# Patient Record
Sex: FEMALE | Race: WHITE
[De-identification: names, ages, dates, MRNs, and addresses within clinical notes are randomized per-mention and may not be internally consistent; named-entity substitution may affect disease eponyms.]

---

## 2018-12-08 ENCOUNTER — HOSPITAL ENCOUNTER (EMERGENCY)
Dept: HOSPITAL 47 - EC | Age: 63
Discharge: OTHER INSTITUTION | End: 2018-12-08
Payer: MEDICARE

## 2018-12-08 VITALS — TEMPERATURE: 98 F

## 2018-12-08 VITALS — DIASTOLIC BLOOD PRESSURE: 58 MMHG | RESPIRATION RATE: 16 BRPM | HEART RATE: 60 BPM | SYSTOLIC BLOOD PRESSURE: 142 MMHG

## 2018-12-08 DIAGNOSIS — E11.40: ICD-10-CM

## 2018-12-08 DIAGNOSIS — I10: ICD-10-CM

## 2018-12-08 DIAGNOSIS — A52.16: ICD-10-CM

## 2018-12-08 DIAGNOSIS — Z79.4: ICD-10-CM

## 2018-12-08 DIAGNOSIS — Z86.14: ICD-10-CM
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
## 2018-12-08 DIAGNOSIS — Z88.2: ICD-10-CM
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
## 2018-12-08 DIAGNOSIS — E66.01: ICD-10-CM

## 2018-12-08 DIAGNOSIS — S82.841A: Primary | ICD-10-CM

## 2018-12-08 DIAGNOSIS — Z79.899: ICD-10-CM

## 2018-12-08 LAB — GLUCOSE BLD-MCNC: 118 MG/DL (ref 75–99)

## 2018-12-08 PROCEDURE — 99284 EMERGENCY DEPT VISIT MOD MDM: CPT

## 2018-12-08 PROCEDURE — 36415 COLL VENOUS BLD VENIPUNCTURE: CPT

## 2018-12-08 PROCEDURE — 96376 TX/PRO/DX INJ SAME DRUG ADON: CPT

## 2018-12-08 PROCEDURE — 96375 TX/PRO/DX INJ NEW DRUG ADDON: CPT

## 2018-12-08 PROCEDURE — 96374 THER/PROPH/DIAG INJ IV PUSH: CPT

## 2018-12-08 NOTE — ED
General Adult HPI





- General


Source: patient, RN notes reviewed, old records reviewed


Mode of arrival: ambulatory


Limitations: no limitations





<Primitivo Caicedo - Last Filed: 12/08/18 19:59>





<Rommel Chowdhury - Last Filed: 12/08/18 20:14>





- General


Chief complaint: Fall


Stated complaint: Fall/ankle injury


Time Seen by Provider: 12/08/18 17:50





- History of Present Illness


Initial comments: 





Patient's a 63-year-old female presenting to the emergency room today by 

transfer from [facility] for a fall that occurred at 10 AM this morning.

  She states that she was walking out of her house down a ramp and she had some 

bags her hands she slipped with her right leg falling underneath her left as 

she went down.  She states that she's had pain to the right ankle was taken to 

the hospital.  Patient was transferred here for orthopedic consult.  Patient 

doesn't pain locally.  Patient denies any other complaints or symptoms at this 

time. Patient denies any recent fever, chills, shortness of breath, chest pain, 

back pain, abdominal pain, nausea or vomiting, headaches or visual changes, or 

any other complaints. (Primitivo Caicedo)





- Related Data


 Home Medications











 Medication  Instructions  Recorded  Confirmed


 


Folic Acid 1 tab PO Q48H 05/01/14 12/08/18


 


Multivitamin [Multivitamins] 1 tab PO DAILY 05/01/14 12/08/18


 


metFORMIN HCL [Glucophage] 500 mg PO TID 05/01/14 12/08/18


 


Ascorbic Acid [Vitamin C] 500 mg PO BID 05/08/14 12/08/18


 


Insulin Degludec [Tresiba 70 unit SQ HS 12/08/18 12/08/18





Flextouch U-100]   


 


Liraglutide [Victoza 2-Fredrick] 1.8 mg SQ HS 12/08/18 12/08/18


 


Lisinopril [Zestril] 2.5 mg PO DAILY 12/08/18 12/08/18


 


Vit C/E/Zn/Coppr/Lutein/Zeaxan 1 cap PO DAILY 12/08/18 12/08/18





[Preservision Areds 2 Softgel]   











 Allergies











Allergy/AdvReac Type Severity Reaction Status Date / Time


 


sulfamethoxazole Allergy  Unknown Verified 12/08/18 17:54





[From Bactrim]     


 


trimethoprim [From Bactrim] Allergy  Unknown Verified 12/08/18 17:54














Review of Systems


ROS Other: All systems not noted in ROS Statement are negative.





<Primitivo Caicedo - Last Filed: 12/08/18 19:59>


ROS Other: All systems not noted in ROS Statement are negative.





<Rommel Chowdhury - Last Filed: 12/08/18 20:14>


ROS Statement: 


Those systems with pertinent positive or pertinent negative responses have been 

documented in the HPI.








Past Medical History


Past Medical History: Diabetes Mellitus, Hypertension


Additional Past Medical History / Comment(s): neuropathy; wound L Leg


History of Any Multi-Drug Resistant Organisms: MRSA


Date of last positivie culture/infection: 2014


MDRO Source:: left foot


Past Surgical History: Back Surgery, Cholecystectomy, Hysterectomy, Orthopedic 

Surgery


Past Anesthesia/Blood Transfusion Reactions: No Reported Reaction


Past Psychological History: No Psychological Hx Reported


Smoking Status: Never smoker


Past Alcohol Use History: Occasional


Past Drug Use History: None Reported





- Past Family History


  ** Mother


Family Medical History: No Reported History





<Primitivo Caicedo - Last Filed: 12/08/18 19:59>





General Exam


Limitations: no limitations





<Primitivo Caicedo - Last Filed: 12/08/18 19:59>





<Rommel Chowdhury - Last Filed: 12/08/18 20:14>





- General Exam Comments


Initial Comments: 





General:  The patient is awake and alert, in no distress, and does not appear 

acutely ill. 


Cardiovascular:  There is a regular rate and rhythm. No murmur, rub or gallop 

is appreciated.


Respiratory:  Lungs are clear to auscultation, respirations are non-labored, 

breath sounds are equal.  No wheezes, stridor, rales, or rhonchi.


Musculoskeletal: Patient's right lower extremities is placed in a posterior OCL 

splint..  Pulses checked and 2+.  Sensations intact.  Cap refill less than 2 

seconds.  


Neurological:  A&O x 3. CN II-XII intact, There are no obvious motor or sensory 

deficits. Coordination appears grossly intact. Speech is normal.


Skin:  Skin is warm and dry and no rashes or lesions are noted. 


Psychiatric:  Cooperative, appropriate mood & affect, normal judgment.   (Primitivo Caicedo)





Course





<Primitivo Caicedo - Last Filed: 12/08/18 19:59>





<Rommel Chowdhury - Last Filed: 12/08/18 20:14>





 Vital Signs











  12/08/18 12/08/18





  17:43 20:04


 


Temperature 98 F 


 


Pulse Rate 75 79


 


Respiratory 16 15





Rate  


 


Blood Pressure 133/58 105/50


 


O2 Sat by Pulse 98 95





Oximetry  














- Reevaluation(s)


Reevaluation #1: 





12/08/18 20:14


medical transfer paperwork and record reviewed (Rommel Chowdhury)





Medical Decision Making





<Primitivo Caicedo - Last Filed: 12/08/18 19:59>





<Rommel Chowdhury - Last Filed: 12/08/18 20:14>





- Medical Decision Making





1843: Case discussed with nurse practitioner Edna on-call for orthopedics.  

She states she will discuss case with Dr. Matthews.





1915: Nurse Practitioner Edna has called back and states that she did speak 

with Dr. Matthews about the fracture.  States that it is a complication case 

and should be transferred. 





 Discussed with attending Dr Chowdhury who has spoken with [facility]ix[facility] Jay and 

awaiting to hear back from them.  (Primitivo Caicedo)





63 female the ER for evaluation will transfer Shayne Mason for orthopedic 

traumatic treatment and evaluation (Rommel Chowdhury)





- Lab Data





 Lab Results











  12/08/18 Range/Units





  20:03 


 


POC Glucose (mg/dL)  118 H  (75-99)  mg/dL


 


POC Glu Operater ID  Edison Saul  














Disposition





<Primitivo Caicedo - Last Filed: 12/08/18 19:59>


Is patient prescribed a controlled substance at d/c from ED?: No





- Out of Hospital Transfer - Req. Specs


Out of Hospital Transfer - Requested Specifics: Other Emergency Center ([facility])





<Rommel Chowdhury - Last Filed: 12/08/18 20:14>


Clinical Impression: 


 Morbid obesity with BMI of 45.0-49.9, adult, Morbid obesity due to excess 

calories, Charcot's joint of foot, Bimalleolar fracture of right ankle





Narrative: 





Intraarticular Comminuted Fracture of Distal Tibia and Fibula (Rommel Chowdhury)


Disposition: OTHER INSTITUTION NOT DEFINED


Condition: Fair


Referrals: 


Trevor Bear, DO [Primary Care Provider] - 1-2 days

## 2020-05-26 ENCOUNTER — HOSPITAL ENCOUNTER (INPATIENT)
Dept: HOSPITAL 47 - EC | Age: 65
LOS: 5 days | Discharge: HOME | DRG: 246 | End: 2020-05-31
Attending: HOSPITALIST | Admitting: HOSPITALIST
Payer: MEDICARE

## 2020-05-26 VITALS — BODY MASS INDEX: 53.8 KG/M2

## 2020-05-26 DIAGNOSIS — I08.1: ICD-10-CM

## 2020-05-26 DIAGNOSIS — J98.11: ICD-10-CM

## 2020-05-26 DIAGNOSIS — Z71.3: ICD-10-CM

## 2020-05-26 DIAGNOSIS — I21.4: Primary | ICD-10-CM

## 2020-05-26 DIAGNOSIS — J44.1: ICD-10-CM

## 2020-05-26 DIAGNOSIS — Z79.899: ICD-10-CM

## 2020-05-26 DIAGNOSIS — E11.40: ICD-10-CM

## 2020-05-26 DIAGNOSIS — Z90.710: ICD-10-CM

## 2020-05-26 DIAGNOSIS — Z90.49: ICD-10-CM

## 2020-05-26 DIAGNOSIS — E87.1: ICD-10-CM

## 2020-05-26 DIAGNOSIS — Z79.4: ICD-10-CM

## 2020-05-26 DIAGNOSIS — I27.21: ICD-10-CM

## 2020-05-26 DIAGNOSIS — N18.3: ICD-10-CM

## 2020-05-26 DIAGNOSIS — E78.5: ICD-10-CM

## 2020-05-26 DIAGNOSIS — Z11.59: ICD-10-CM

## 2020-05-26 DIAGNOSIS — Z86.14: ICD-10-CM

## 2020-05-26 DIAGNOSIS — I13.0: ICD-10-CM

## 2020-05-26 DIAGNOSIS — I25.10: ICD-10-CM

## 2020-05-26 DIAGNOSIS — K76.1: ICD-10-CM

## 2020-05-26 DIAGNOSIS — J96.01: ICD-10-CM

## 2020-05-26 DIAGNOSIS — E11.22: ICD-10-CM

## 2020-05-26 DIAGNOSIS — I50.23: ICD-10-CM

## 2020-05-26 DIAGNOSIS — I25.5: ICD-10-CM

## 2020-05-26 DIAGNOSIS — E66.2: ICD-10-CM

## 2020-05-26 DIAGNOSIS — Z98.890: ICD-10-CM

## 2020-05-26 DIAGNOSIS — Z88.2: ICD-10-CM

## 2020-05-26 DIAGNOSIS — I87.8: ICD-10-CM

## 2020-05-26 LAB
ALBUMIN SERPL-MCNC: 3.4 G/DL (ref 3.5–5)
ALP SERPL-CCNC: 146 U/L (ref 38–126)
ALT SERPL-CCNC: 45 U/L (ref 4–34)
ANION GAP SERPL CALC-SCNC: 9 MMOL/L
AST SERPL-CCNC: 39 U/L (ref 14–36)
BASOPHILS # BLD AUTO: 0 K/UL (ref 0–0.2)
BASOPHILS NFR BLD AUTO: 0 %
BUN SERPL-SCNC: 30 MG/DL (ref 7–17)
CALCIUM SPEC-MCNC: 8.4 MG/DL (ref 8.4–10.2)
CHLORIDE SERPL-SCNC: 98 MMOL/L (ref 98–107)
CO2 SERPL-SCNC: 26 MMOL/L (ref 22–30)
EOSINOPHIL # BLD AUTO: 0 K/UL (ref 0–0.7)
EOSINOPHIL NFR BLD AUTO: 0 %
ERYTHROCYTE [DISTWIDTH] IN BLOOD BY AUTOMATED COUNT: 4.33 M/UL (ref 3.8–5.4)
ERYTHROCYTE [DISTWIDTH] IN BLOOD: 14.7 % (ref 11.5–15.5)
GLUCOSE BLD-MCNC: 179 MG/DL (ref 75–99)
GLUCOSE BLD-MCNC: 242 MG/DL (ref 75–99)
GLUCOSE SERPL-MCNC: 167 MG/DL (ref 74–99)
HCT VFR BLD AUTO: 37.5 % (ref 34–46)
HGB BLD-MCNC: 11.5 GM/DL (ref 11.4–16)
LYMPHOCYTES # SPEC AUTO: 0.5 K/UL (ref 1–4.8)
LYMPHOCYTES NFR SPEC AUTO: 7 %
MCH RBC QN AUTO: 26.7 PG (ref 25–35)
MCHC RBC AUTO-ENTMCNC: 30.8 G/DL (ref 31–37)
MCV RBC AUTO: 86.6 FL (ref 80–100)
MONOCYTES # BLD AUTO: 0.1 K/UL (ref 0–1)
MONOCYTES NFR BLD AUTO: 2 %
NEUTROPHILS # BLD AUTO: 6.5 K/UL (ref 1.3–7.7)
NEUTROPHILS NFR BLD AUTO: 90 %
PLATELET # BLD AUTO: 280 K/UL (ref 150–450)
POTASSIUM SERPL-SCNC: 4.8 MMOL/L (ref 3.5–5.1)
PROT SERPL-MCNC: 6.2 G/DL (ref 6.3–8.2)
SODIUM SERPL-SCNC: 133 MMOL/L (ref 137–145)
WBC # BLD AUTO: 7.3 K/UL (ref 3.8–10.6)

## 2020-05-26 PROCEDURE — 84484 ASSAY OF TROPONIN QUANT: CPT

## 2020-05-26 PROCEDURE — 94640 AIRWAY INHALATION TREATMENT: CPT

## 2020-05-26 PROCEDURE — 87635 SARS-COV-2 COVID-19 AMP PRB: CPT

## 2020-05-26 PROCEDURE — 96366 THER/PROPH/DIAG IV INF ADDON: CPT

## 2020-05-26 PROCEDURE — 94760 N-INVAS EAR/PLS OXIMETRY 1: CPT

## 2020-05-26 PROCEDURE — 96375 TX/PRO/DX INJ NEW DRUG ADDON: CPT

## 2020-05-26 PROCEDURE — 85730 THROMBOPLASTIN TIME PARTIAL: CPT

## 2020-05-26 PROCEDURE — 96365 THER/PROPH/DIAG IV INF INIT: CPT

## 2020-05-26 PROCEDURE — 99285 EMERGENCY DEPT VISIT HI MDM: CPT

## 2020-05-26 PROCEDURE — 71045 X-RAY EXAM CHEST 1 VIEW: CPT

## 2020-05-26 PROCEDURE — 83880 ASSAY OF NATRIURETIC PEPTIDE: CPT

## 2020-05-26 PROCEDURE — 80053 COMPREHEN METABOLIC PANEL: CPT

## 2020-05-26 PROCEDURE — 96376 TX/PRO/DX INJ SAME DRUG ADON: CPT

## 2020-05-26 PROCEDURE — 85025 COMPLETE CBC W/AUTO DIFF WBC: CPT

## 2020-05-26 PROCEDURE — 93306 TTE W/DOPPLER COMPLETE: CPT

## 2020-05-26 PROCEDURE — 93458 L HRT ARTERY/VENTRICLE ANGIO: CPT

## 2020-05-26 PROCEDURE — 80048 BASIC METABOLIC PNL TOTAL CA: CPT

## 2020-05-26 PROCEDURE — 96372 THER/PROPH/DIAG INJ SC/IM: CPT

## 2020-05-26 PROCEDURE — 85027 COMPLETE CBC AUTOMATED: CPT

## 2020-05-26 RX ADMIN — IPRATROPIUM BROMIDE AND ALBUTEROL SULFATE SCH ML: .5; 3 SOLUTION RESPIRATORY (INHALATION) at 10:56

## 2020-05-26 RX ADMIN — INSULIN ASPART SCH UNIT: 100 INJECTION, SOLUTION INTRAVENOUS; SUBCUTANEOUS at 21:38

## 2020-05-26 RX ADMIN — IPRATROPIUM BROMIDE AND ALBUTEROL SULFATE SCH ML: .5; 3 SOLUTION RESPIRATORY (INHALATION) at 15:44

## 2020-05-26 RX ADMIN — HEPARIN SODIUM SCH MLS/HR: 10000 INJECTION, SOLUTION INTRAVENOUS at 21:41

## 2020-05-26 RX ADMIN — FUROSEMIDE SCH MG: 10 INJECTION, SOLUTION INTRAMUSCULAR; INTRAVENOUS at 21:38

## 2020-05-26 RX ADMIN — IPRATROPIUM BROMIDE AND ALBUTEROL SULFATE SCH ML: .5; 3 SOLUTION RESPIRATORY (INHALATION) at 19:47

## 2020-05-26 RX ADMIN — INSULIN ASPART SCH UNIT: 100 INJECTION, SOLUTION INTRAVENOUS; SUBCUTANEOUS at 17:09

## 2020-05-26 RX ADMIN — FUROSEMIDE SCH: 10 INJECTION, SOLUTION INTRAMUSCULAR; INTRAVENOUS at 09:46

## 2020-05-26 NOTE — ED
Recheck HPI





- General


Chief Complaint: Shortness of Breath


Stated Complaint: Shortness of Breath


Time Seen by Provider: 05/26/20 04:40


Source: patient, EMS, RN notes reviewed, old records reviewed


Mode of arrival: EMS


Limitations: no limitations





- History of Present Illness


Initial Comments: 





This is a 65-year-old female she is presented today is accepted in transfer for 

evaluation regarding CHF COPD hypoxia respiratory disease as well as elevated 

troponin elevated be Troponin mildly elevated 0.03 be 1 2000.  Patient feeling 

better on arrival here in the emergency department, no current chest pain or 

shortness of breath states his symptoms have much improved since arrival at the 

emergency department


MD Complaint: abnormal lab (Patient abnormal labs and abnormal physical exam)


-: days(s)


Returns Today for: Called Because of Abnormal Lab/Test


Symptoms Since Prior Visit: no new symptoms


Context: planned re-check


Associated Symptoms: none





- Related Data


                                Home Medications











 Medication  Instructions  Recorded  Confirmed


 


Folic Acid 1 tab PO Q48H 05/01/14 12/08/18


 


Multivitamin [Multivitamins] 1 tab PO DAILY 05/01/14 12/08/18


 


metFORMIN HCL [Glucophage] 500 mg PO TID 05/01/14 12/08/18


 


Ascorbic Acid [Vitamin C] 500 mg PO BID 05/08/14 12/08/18


 


Insulin Degludec [Tresiba 70 unit SQ HS 12/08/18 12/08/18





Flextouch U-100]   


 


Liraglutide [Victoza 2-Fredrick] 1.8 mg SQ HS 12/08/18 12/08/18


 


Lisinopril [Zestril] 2.5 mg PO DAILY 12/08/18 12/08/18


 


Vit C/E/Zn/Coppr/Lutein/Zeaxan 1 cap PO DAILY 12/08/18 12/08/18





[Preservision Areds 2 Softgel]   











                                    Allergies











Allergy/AdvReac Type Severity Reaction Status Date / Time


 


sulfamethoxazole Allergy  Unknown Verified 12/08/18 17:54





[From Bactrim]     


 


trimethoprim [From Bactrim] Allergy  Unknown Verified 12/08/18 17:54














Review of Systems


ROS Statement: 


Those systems with pertinent positive or pertinent negative responses have been 

documented in the HPI.





ROS Other: All systems not noted in ROS Statement are negative.





Past Medical History


Past Medical History: Diabetes Mellitus, Hypertension


Additional Past Medical History / Comment(s): neuropathy; wound L Leg


History of Any Multi-Drug Resistant Organisms: MRSA


Date of last positivie culture/infection: 2014


MDRO Source:: left foot


Past Surgical History: Back Surgery, Cholecystectomy, Hysterectomy, Orthopedic 

Surgery


Past Anesthesia/Blood Transfusion Reactions: No Reported Reaction


Past Psychological History: No Psychological Hx Reported


Smoking Status: Never smoker


Past Alcohol Use History: Occasional


Past Drug Use History: None Reported





- Past Family History


  ** Mother


Family Medical History: No Reported History





General Exam


Limitations: no limitations


General appearance: alert, in no apparent distress


Head exam: Present: atraumatic, normocephalic, normal inspection


Eye exam: Present: normal appearance, PERRL, EOMI.  Absent: scleral icterus, 

conjunctival injection, periorbital swelling


ENT exam: Present: normal exam, mucous membranes moist


Neck exam: Present: normal inspection.  Absent: tenderness, meningismus, 

lymphadenopathy


Respiratory exam: Present: respiratory distress, wheezes, rales, accessory 

muscle use, decreased breath sounds, prolonged expiratory.  Absent: rhonchi, 

stridor


Cardiovascular Exam: Present: regular rate, normal rhythm, normal heart sounds. 

Absent: systolic murmur, diastolic murmur, rubs, gallop, clicks


GI/Abdominal exam: Present: soft, normal bowel sounds.  Absent: distended, 

tenderness, guarding, rebound, rigid


Extremities exam: Present: normal inspection, full ROM, normal capillary refill.

 Absent: tenderness, pedal edema, joint swelling, calf tenderness


Back exam: Present: normal inspection


Neurological exam: Present: alert, oriented X3, CN II-XII intact


Psychiatric exam: Present: normal affect, normal mood


Skin exam: Present: warm, dry, intact, normal color.  Absent: rash





Course


                                   Vital Signs











  05/26/20 05/26/20





  04:16 04:22


 


Temperature 98.9 F 


 


Pulse Rate 76 


 


Respiratory 18 18





Rate  


 


Blood Pressure 102/65 


 


O2 Sat by Pulse 100 





Oximetry  














- Reevaluation(s)


Reevaluation #1: 





05/26/20 05:04


Medical record transferring paperwork is reviewed





Spoke with transferring physician agreeable transfer for


Reevaluation #2: 





05/26/20 05:05


Patient has no complaints of chest pain or shortness of breath





- Consultations


Consultation #1: 





Spoke with Dr. Mead agreeable for admission





Medical Decision Making





- EKG Data


-: EKG Interpreted by Me (EKG shows sinus rhythm of 77, , QRS 96, QTc 468)





Disposition


Clinical Impression: 


 Acute pulmonary edema, Acute exacerbation of chronic obstructive pulmonary 

disease, Congestive heart failure, Hypoxia





Disposition: ADMITTED AS IP TO THIS HOSP


Condition: Fair


Is patient prescribed a controlled substance at d/c from ED?: No


Referrals: 


Trevor Bear DO [Primary Care Provider] - 1-2 days

## 2020-05-26 NOTE — P.CRDCN
History of Present Illness


Consult date: 05/26/20


Chief complaint: Shortness of breath/lower extremities edema


History of present illness: 





This is a very pleasant 65-year-old female patient with a past medical history 

significant for morbid obesity, diabetes, hypertension, and dyslipidemia as well

as tonic lower extremities edema.  The patient was in her usual state of altered

about 3 weeks ago when she started experiencing progressive exertional dyspnea 

associated with progressive lower extremities edema.  She stated that she gained

50 pounds within one month.  No symptoms of chest pain or chest discomfort, 

dizziness, heart racing, or syncope.  The patient ever being told in the past t

hat she does have congestive heart failure.  The patient never seen any 

cardiologist as an outpatient.  No history of coronary artery disease or any 

cardiac arrhythmia.  On physical examination she does have severe bilateral 

lower extremities edema was chronic skin changes.  She does have diminished 

breathing sounds during her physical examination as well.  The EKG showed sinus 

rhythm without any significant ST or T-wave abnormalities but nonspecific 

changes.  No blood work in the computer but I am going to review her blood work 

from the hospital she was transferred from.  Meanwhile we'll repeat her blood 

work including CBC, BMP, as well as cardiac enzymes, as well as BMP.  The 

patient was started on Lasix already.  I am going to obtain an echocardiogram to

evaluate her left ventricular systolic and diastolic function and also for any 

intracardiac valves abnormalities.  We'll continue following up with the 

patient.





Past Medical History


Past Medical History: Diabetes Mellitus, Hypertension


Additional Past Medical History / Comment(s): neuropathy; wound L Leg


History of Any Multi-Drug Resistant Organisms: MRSA


Date of last positivie culture/infection: 2014


MDRO Source:: left foot


Past Surgical History: Back Surgery, Cholecystectomy, Hysterectomy, Orthopedic 

Surgery


Past Anesthesia/Blood Transfusion Reactions: No Reported Reaction


Past Psychological History: No Psychological Hx Reported


Smoking Status: Never smoker


Past Alcohol Use History: Occasional


Past Drug Use History: None Reported





- Past Family History


  ** Mother


Family Medical History: No Reported History





Medications and Allergies


                                Home Medications











 Medication  Instructions  Recorded  Confirmed  Type


 


Multivitamin [Multivitamins] 1 tab PO DAILY 05/01/14 05/26/20 History


 


Ascorbic Acid [Vitamin C] 500 mg PO BID 05/08/14 05/26/20 History


 


Insulin Degludec [Tresiba 70 units SQ HS 05/26/20 05/26/20 History





Flextouch U-200]    


 


Liraglutide [Victoza 3-Fredrick] 1.8 mg SQ DAILY 05/26/20 05/26/20 History


 


Lisinopril [Zestril] 10 mg PO DAILY 05/26/20 05/26/20 History


 


Tolterodine Tartrate [Detrol LA] 4 mg PO DAILY 05/26/20 05/26/20 History


 


metFORMIN HCL [Glucophage] 500 mg PO TID 05/26/20 05/26/20 History








                                    Allergies











Allergy/AdvReac Type Severity Reaction Status Date / Time


 


sulfamethoxazole Allergy  Unknown Verified 05/26/20 08:56





[From Bactrim]     


 


trimethoprim [From Bactrim] Allergy  Unknown Verified 05/26/20 08:56














Physical Exam


Vitals: 


                                   Vital Signs











  Temp Pulse Resp BP Pulse Ox


 


 05/26/20 11:17  98.1 F  80  20  126/74  97


 


 05/26/20 11:06   88   


 


 05/26/20 10:56   88   


 


 05/26/20 10:37   76  20  123/67  97


 


 05/26/20 06:00   75  18  112/61  99


 


 05/26/20 05:29   76   


 


 05/26/20 05:20   74   


 


 05/26/20 04:22    18  


 


 05/26/20 04:16  98.9 F  76  18  102/65  100








                                Intake and Output











 05/25/20 05/26/20 05/26/20





 22:59 06:59 14:59


 


Output Total   700


 


Balance   -700


 


Output:   


 


  Urine   700


 


Other:   


 


  Voiding Method   Indwelling Catheter


 


  Weight  151.5 kg 














- Constitutional


General appearance: no acute distress





- Respiratory


Respiratory: bilateral: diminished





- Cardiovascular


Rhythm: regular


Heart sounds: normal: S1, S2


Abnormal Heart Sounds: systolic murmur





Results





                                 05/26/20 11:59





                                 05/26/20 11:59


                                 Cardiac Enzymes











  05/26/20 Range/Units





  11:59 


 


AST  39 H  (14-36)  U/L








                                       CBC











  05/26/20 Range/Units





  11:59 


 


WBC  7.3  (3.8-10.6)  k/uL


 


RBC  4.33  (3.80-5.40)  m/uL


 


Hgb  11.5  (11.4-16.0)  gm/dL


 


Hct  37.5  (34.0-46.0)  %


 


Plt Count  280  (150-450)  k/uL








                          Comprehensive Metabolic Panel











  05/26/20 Range/Units





  11:59 


 


Sodium  133 L  (137-145)  mmol/L


 


Potassium  4.8  (3.5-5.1)  mmol/L


 


Chloride  98  ()  mmol/L


 


Carbon Dioxide  26  (22-30)  mmol/L


 


BUN  30 H  (7-17)  mg/dL


 


Creatinine  1.13 H  (0.52-1.04)  mg/dL


 


Glucose  167 H  (74-99)  mg/dL


 


Calcium  8.4  (8.4-10.2)  mg/dL


 


AST  39 H  (14-36)  U/L


 


ALT  45 H  (4-34)  U/L


 


Alkaline Phosphatase  146 H  ()  U/L


 


Total Protein  6.2 L  (6.3-8.2)  g/dL


 


Albumin  3.4 L  (3.5-5.0)  g/dL








                               Current Medications











Generic Name Dose Route Start Last Admin





  Trade Name Freq  PRN Reason Stop Dose Admin


 


Albuterol Sulfate  2.5 mg  05/26/20 04:58 





  Ventolin Nebulized  INHALATION  





  RT-QID PRN  





  Shortness Of Breath  


 


Albuterol/Ipratropium  3 ml  05/26/20 08:00  05/26/20 10:56





  Duoneb 0.5 Mg-3 Mg/3 Ml Soln  INHALATION   3 ml





  RT-QID ANNE   Administration


 


Enoxaparin Sodium  40 mg  05/26/20 09:00  05/26/20 11:16





  Lovenox  SQ   40 mg





  DAILY ANNE   Administration


 


Furosemide  40 mg  05/26/20 09:00  05/26/20 09:46





  Lasix  IV   Not Given





  Q12HR ANNE  


 


Lisinopril  10 mg  05/27/20 09:00 





  Zestril  PO  





  DAILY ANNE  








                                Intake and Output











 05/25/20 05/26/20 05/26/20





 22:59 06:59 14:59


 


Output Total   700


 


Balance   -700


 


Output:   


 


  Urine   700


 


Other:   


 


  Voiding Method   Indwelling Catheter


 


  Weight  151.5 kg 








                                        





                                 05/26/20 11:59 





                                 05/26/20 11:59 











Assessment and Plan


Assessment: 





Assessment


#1 congestive heart failure exacerbation of unknown etiology


#2 morbid obesity


#3 diabetes


#4 multiple comorbid conditions





Plan


#1 rule out acute coronary event.  We'll follow-up on the serial cardiac enzymes


#2 review with a blood work from the hospital she was transferred from


#3 obtain an echocardiogram was Doppler


#4 continue the current dose of Lasix IV


#5 continue monitoring the kidney function and electrolytes


#6 follow-up with the patient





Thank you for allowing us participate in her care

## 2020-05-26 NOTE — P.CNPUL
History of Present Illness


Consult date: 05/26/20


Requesting physician: Rommel Chowdhury


Reason for consult: dyspnea, pleural effusion, abnormal CXR/CT


Chief complaint: shortness of breath


History of present illness: 


 65-year-old white female patient of Dr. Bella Thorpe in [place], with history

of hypertension, and history of nonhealing left leg wound with MRSA infection, 

morbid obesity, nonsmoker, the patient denies any history of chronic lung 

disease. patient presented to the emergency department on 05/26/2020 at 4:15 in 

the morning per EMS from [facility] for evaluation of worsening shortness

of breath, patient felt like she could not get a breath in, but denied any chest

pain, no fever or chills.  She states she had significant weight gain of around 

40 pounds in the last month.  Her lower extremities appear to have changes of 

chronic venous stasis and chronic swelling, however patient states they're 

significantly more swollen than usual. chest x-ray showed mild congestion, 

probable atelectasis at the lung bases, and pleural effusions. EKG showed normal

sinus rhythm with a rate of 77 with low voltage QRS, and evidence of anterior 

infarct of undetermined age. at [facility] patient had abnormal troponins

and possibility of non-ST elevated MI is being considered. She was started on IV

Lasix, IV steroids, and nebulized bronchodilators.  Currently feeling better. 

She is awaiting a bed on selective care unit.








Review of Systems


All systems: negative


Constitutional: Denies chills, Denies fever


Eyes: denies blurred vision, denies pain


Ears, nose, mouth and throat: Denies headache, Denies sore throat


Cardiovascular: Reports decreased exercise tolerance, Reports dyspnea on 

exertion, Reports edema, Reports orthopnea, Denies chest pain, Denies shortness 

of breath


Respiratory: Reports dyspnea, Denies cough


Gastrointestinal: Denies abdominal pain, Denies diarrhea, Denies nausea, Denies 

vomiting


Genitourinary: Denies dysuria, Denies hematuria


Musculoskeletal: Denies myalgias


Integumentary: Reports darkening of skin, Denies pruritus, Denies rash


Neurological: Denies numbness, Denies weakness


Psychiatric: Denies anxiety, Denies depression


Endocrine: Denies fatigue, Denies weight change





Past Medical History


Past Medical History: Diabetes Mellitus, Hypertension


Additional Past Medical History / Comment(s): neuropathy; wound L Leg


History of Any Multi-Drug Resistant Organisms: MRSA


Date of last positivie culture/infection: 2014


MDRO Source:: left foot


Past Surgical History: Back Surgery, Cholecystectomy, Hysterectomy, Orthopedic 

Surgery


Past Anesthesia/Blood Transfusion Reactions: No Reported Reaction


Past Psychological History: No Psychological Hx Reported


Smoking Status: Never smoker


Past Alcohol Use History: Occasional


Past Drug Use History: None Reported





- Past Family History


  ** Mother


Family Medical History: No Reported History





Medications and Allergies


                                Home Medications











 Medication  Instructions  Recorded  Confirmed  Type


 


Multivitamin [Multivitamins] 1 tab PO DAILY 05/01/14 05/26/20 History


 


Ascorbic Acid [Vitamin C] 500 mg PO BID 05/08/14 05/26/20 History


 


Insulin Degludec [Tresiba 70 units SQ HS 05/26/20 05/26/20 History





Flextouch U-200]    


 


Liraglutide [Victoza 3-Fredrick] 1.8 mg SQ DAILY 05/26/20 05/26/20 History


 


Lisinopril [Zestril] 10 mg PO DAILY 05/26/20 05/26/20 History


 


Tolterodine Tartrate [Detrol LA] 4 mg PO DAILY 05/26/20 05/26/20 History


 


metFORMIN HCL [Glucophage] 500 mg PO TID 05/26/20 05/26/20 History








                                    Allergies











Allergy/AdvReac Type Severity Reaction Status Date / Time


 


sulfamethoxazole Allergy  Unknown Verified 05/26/20 08:56





[From Bactrim]     


 


trimethoprim [From Bactrim] Allergy  Unknown Verified 05/26/20 08:56














Physical Exam


Vitals: 


                                   Vital Signs











  Temp Pulse Resp BP Pulse Ox


 


 05/26/20 06:00   75  18  112/61  99


 


 05/26/20 05:29   76   


 


 05/26/20 05:20   74   


 


 05/26/20 04:22    18  


 


 05/26/20 04:16  98.9 F  76  18  102/65  100








                                Intake and Output











 05/25/20 05/26/20 05/26/20





 22:59 06:59 14:59


 


Output Total   700


 


Balance   -700


 


Output:   


 


  Urine   700


 


Other:   


 


  Voiding Method   Indwelling Catheter


 


  Weight  151.5 kg 











 GENERAL EXAM: Alert, a pleasant, 65-year-old obese white female, currently on 2

L of oxygen with pulse ox of 97% resting comfortably on the gurney in the 

emergency department


HEAD: Normocephalic/atraumatic.


EYES: Normal reaction of pupils, equal size.  Conjunctiva pink, sclera white.


NOSE: Clear with pink turbinates.


THROAT: No erythema or exudates.


NECK: No masses, no JVD, no thyroid enlargement, no adenopathy.


CHEST: No chest wall deformity.  Symmetrical expansion. 


LUNGS: Equal air entry with no crackles, wheeze, rhonchi or dullness.


CVS: Regular rate and rhythm, normal S1 and S2, no gallops, no murmurs, no rubs


ABDOMEN: Soft, nontender.  No hepatosplenomegaly, normal bowel sounds, no 

guarding or rigidity.


EXTREMITIES: No clubbing, chronic venous stasis changes present to lower 

extremities, suspec t some chronic swelling to lower extremities no cyanosis, 2+

pulses and upper and lower extremities. 2+ lower extremity edema


MUSCULOSKELETAL: Muscle strength and tone normal.


SPINE: No scoliosis or deformity


SKIN: No rashes


CENTRAL NERVOUS SYSTEM: Alert and oriented -3.  No focal deficits, tone is 

normal in all 4 extremities.


PSYCHIATRIC: Alert and oriented -3.  Appropriate affect.  Intact judgment and 

insight.











Results





- Diagnostic Findings


Chest x-ray: report reviewed, image reviewed





Assessment and Plan


Plan: 


 Assessment:





#1. Acute hypoxemic respiratory failure related to acute exacerbation of 

congestive heart failure with unknown ejection fraction





#2. Rule out non-ST elevated myocardial infarction





#3.  Significant weight gain of around 40 pounds in the last month





#4. Morbid obesity





#5. Lifetime nonsmoker





#6. diabetes mellitus type 2, with diabetic neuropathy





#7.  History of MRSA infection and nonhealing wound in the left leg





Plan:





Continue IV diuretics, accurate intake and output, daily weights, daily 

electrolytes and renal profile, follow-up troponin, will obtain echocardiogram, 

follow-up chest x-ray in the morning. COVID 19 test is pending. Cardiology 

evaluation is pending. we'll continue to follow





I performed a history & physical examination of the patient and discussed their 

management with my nurse practitioner, Kiki Rivero.  I reviewed the nurse 

practitioner's note and agree with the documented findings and plan of care.  

Lung sounds are positive for diminished breath sounds.  The findings and the 

impression was discussed with the patient.  I attest to the documentation by the

nurse practitioner. 





Time with Patient: Greater than 30

## 2020-05-26 NOTE — XR
EXAMINATION TYPE: XR chest 1V portable

 

DATE OF EXAM: 5/26/2020

 

COMPARISON: 5/26/2020

 

HISTORY: Short of breath

 

TECHNIQUE:

 

FINDINGS: There is some blunting of the costophrenic angles. There is mild congestion. Exam limited b
y patient size. There is no obvious heart failure. There is probably some atelectasis at the lung bas
es.

 

IMPRESSION: Pleural effusions and atelectasis at the lung bases probably increased compared to last e
xam 4 hours ago.

No obvious heart failure.

## 2020-05-26 NOTE — P.HPIM
History of Present Illness


65-year-old morbidly obese female came in with compensative from bilateral pedal

edema 44 pound weight gain and some shortness of breath and orthopnea.  Patient 

believes she has sleep apnea but never gets study done patient appears to have 

severe restrictive lung disease.  Patient is receiving Lasix had not had an 

echocardiogram available from the past.  Patient does have chronic venous stasis

of both lower extremities.  Patient denied any chest pain chest x-ray showed 

mild congestion possible DF atelectasis in both lower lung bases EKG did not 

show any acute ST-T wave changes cardiology evaluated patient will not be 

evaluated the patient as well.  Patient was started on IV Lasix.








Review of Systems











REVIEW OF SYSTEMS: 


CONSTITUTIONAL: No fever, no malaise, no fatigue. 


HEENT: No recent visual problems or hearing problems. Denied any sore throat. 


CARDIOVASCULAR: No chest pain, orthopnea, PND, no palpitations, no syncope. 


PULMONARY:  no cough, no hemoptysis. 


GASTROINTESTINAL: No diarrhea, no nausea, no vomiting, no abdominal pain. 


NEUROLOGICAL: No headaches, no weakness, no numbness. 


HEMATOLOGICAL: Denies any bleeding or petechiae. 


GENITOURINARY: Denies any burning micturition, frequency, or urgency. 


MUSCULOSKELETAL/RHEUMATOLOGICAL: Denies any joint pain, swelling, or any muscle 

pain. 


ENDOCRINE: Denies any polyuria or polydipsia. 





The rest of the 14-point review of systems is negative.








Past Medical History


Past Medical History: Diabetes Mellitus, Hypertension


Additional Past Medical History / Comment(s): neuropathy; wound L Leg


History of Any Multi-Drug Resistant Organisms: MRSA


Date of last positivie culture/infection: 2014


MDRO Source:: left foot


Past Surgical History: Back Surgery, Cholecystectomy, Hysterectomy, Orthopedic 

Surgery


Past Anesthesia/Blood Transfusion Reactions: No Reported Reaction


Past Psychological History: No Psychological Hx Reported


Smoking Status: Never smoker


Past Alcohol Use History: Occasional


Past Drug Use History: None Reported





- Past Family History


  ** Mother


Family Medical History: No Reported History





Medications and Allergies


                                Home Medications











 Medication  Instructions  Recorded  Confirmed  Type


 


Multivitamin [Multivitamins] 1 tab PO DAILY 05/01/14 05/26/20 History


 


Ascorbic Acid [Vitamin C] 500 mg PO BID 05/08/14 05/26/20 History


 


Insulin Degludec [Tresiba 70 units SQ HS 05/26/20 05/26/20 History





Flextouch U-200]    


 


Liraglutide [Victoza 3-Fredrick] 1.8 mg SQ DAILY 05/26/20 05/26/20 History


 


Lisinopril [Zestril] 10 mg PO DAILY 05/26/20 05/26/20 History


 


Tolterodine Tartrate [Detrol LA] 4 mg PO DAILY 05/26/20 05/26/20 History


 


metFORMIN HCL [Glucophage] 500 mg PO TID 05/26/20 05/26/20 History








                                    Allergies











Allergy/AdvReac Type Severity Reaction Status Date / Time


 


sulfamethoxazole Allergy  Unknown Verified 05/26/20 08:56





[From Bactrim]     


 


trimethoprim [From Bactrim] Allergy  Unknown Verified 05/26/20 08:56














Physical Exam


Vitals: 


                                   Vital Signs











  Temp Pulse Resp BP Pulse Ox


 


 05/26/20 11:17  98.1 F  80  20  126/74  97


 


 05/26/20 11:06   88   


 


 05/26/20 10:56   88   


 


 05/26/20 10:37   76  20  123/67  97


 


 05/26/20 06:00   75  18  112/61  99


 


 05/26/20 05:29   76   


 


 05/26/20 05:20   74   


 


 05/26/20 04:22    18  


 


 05/26/20 04:16  98.9 F  76  18  102/65  100








                                Intake and Output











 05/26/20 05/26/20 05/26/20





 06:59 14:59 22:59


 


Output Total  700 


 


Balance  -700 


 


Output:   


 


  Urine  700 


 


Other:   


 


  Voiding Method  Indwelling Catheter 


 


  Weight 151.5 kg  

















PHYSICAL EXAMINATION: 





GENERAL: The patient is alert and oriented x3, not in any acute distress.  Her 

but obese with BMI of around 54


HEENT: Pupils are round and equally reacting to light. EOMI. No scleral icterus.

No conjunctival pallor. Normocephalic, atraumatic. No pharyngeal erythema. No 

thyromegaly. 


CARDIOVASCULAR: S1 and S2 present. No murmurs, rubs, or gallops. 


PULMONARY: Chest is clear to auscultation, no wheezing or crackles. 


ABDOMEN: Soft, nontender, nondistended, normoactive bowel sounds. No palpable 

organomegaly. 


MUSCULOSKELETAL: No joint swelling or deformity.


EXTREMITIES: No cyanosis, clubbing, extensive bilateral pedal edema with chronic

venous stasis and venous stasis dermatosis of both legs with more healed ulcers


NEUROLOGICAL: Gross neurological examination did not reveal any focal deficits. 


SKIN: No rashes. 

















Results


CBC & Chem 7: 


                                 05/26/20 11:59





                                 05/26/20 11:59


Labs: 


                  Abnormal Lab Results - Last 24 Hours (Table)











  05/26/20 05/26/20 05/26/20 Range/Units





  11:31 11:59 11:59 


 


MCHC   30.8 L   (31.0-37.0)  g/dL


 


Lymphocytes #   0.5 L   (1.0-4.8)  k/uL


 


Sodium    133 L  (137-145)  mmol/L


 


BUN    30 H  (7-17)  mg/dL


 


Creatinine    1.13 H  (0.52-1.04)  mg/dL


 


Glucose    167 H  (74-99)  mg/dL


 


AST    39 H  (14-36)  U/L


 


ALT    45 H  (4-34)  U/L


 


Alkaline Phosphatase    146 H  ()  U/L


 


Troponin I  0.042 H*    (0.000-0.034)  ng/mL


 


Total Protein    6.2 L  (6.3-8.2)  g/dL


 


Albumin    3.4 L  (3.5-5.0)  g/dL














Assessment and Plan


Plan: 


Bilateral pedal edema shortness of breath patient most probably has severe 

pulmonary hypertension from undiagnosed sleep apnea including to extensive pedal

edema fluid retention and right-sided heart failure I cannot completely rule out

left-sided heart failure echocardiogram is pending continue with IV Lasix.  

Shortness of breath is partly secondary to her obesity obesity hypoventilation 

syndrome.


-Hyponatremia hypervolemic hyponatremia expected to improve with IV Lasix


-Chronic kidney disease stage III secondary to diabetic nephropathy


-Mildly elevated troponin secondary to shortness of breath


-Mildly elevated liver enzymes secondary to hepatic congestion


-Obesity with sleep apnea and possible based stay hypoventilation syndrome


-Type 2 diabetes mellitus: The patient will be resumed on home regimen along w

ith sliding scale


-DVT prophylaxis with Lovenox

## 2020-05-27 LAB
ANION GAP SERPL CALC-SCNC: 9 MMOL/L
BUN SERPL-SCNC: 34 MG/DL (ref 7–17)
CALCIUM SPEC-MCNC: 8.8 MG/DL (ref 8.4–10.2)
CHLORIDE SERPL-SCNC: 97 MMOL/L (ref 98–107)
CO2 SERPL-SCNC: 29 MMOL/L (ref 22–30)
GLUCOSE BLD-MCNC: 163 MG/DL (ref 75–99)
GLUCOSE BLD-MCNC: 187 MG/DL (ref 75–99)
GLUCOSE BLD-MCNC: 192 MG/DL (ref 75–99)
GLUCOSE BLD-MCNC: 198 MG/DL (ref 75–99)
GLUCOSE BLD-MCNC: 216 MG/DL (ref 75–99)
GLUCOSE SERPL-MCNC: 199 MG/DL (ref 74–99)
POTASSIUM SERPL-SCNC: 5 MMOL/L (ref 3.5–5.1)
SODIUM SERPL-SCNC: 135 MMOL/L (ref 137–145)

## 2020-05-27 RX ADMIN — FUROSEMIDE SCH MG: 10 INJECTION, SOLUTION INTRAMUSCULAR; INTRAVENOUS at 09:13

## 2020-05-27 RX ADMIN — LISINOPRIL SCH MG: 10 TABLET ORAL at 09:11

## 2020-05-27 RX ADMIN — METOPROLOL TARTRATE SCH MG: 25 TABLET, FILM COATED ORAL at 21:13

## 2020-05-27 RX ADMIN — OXYBUTYNIN CHLORIDE SCH MG: 10 TABLET, EXTENDED RELEASE ORAL at 09:11

## 2020-05-27 RX ADMIN — FUROSEMIDE SCH MG: 10 INJECTION, SOLUTION INTRAMUSCULAR; INTRAVENOUS at 21:14

## 2020-05-27 RX ADMIN — INSULIN ASPART SCH UNIT: 100 INJECTION, SOLUTION INTRAVENOUS; SUBCUTANEOUS at 21:14

## 2020-05-27 RX ADMIN — IPRATROPIUM BROMIDE AND ALBUTEROL SULFATE SCH ML: .5; 3 SOLUTION RESPIRATORY (INHALATION) at 16:14

## 2020-05-27 RX ADMIN — INSULIN ASPART SCH UNIT: 100 INJECTION, SOLUTION INTRAVENOUS; SUBCUTANEOUS at 13:30

## 2020-05-27 RX ADMIN — HEPARIN SODIUM SCH MLS/HR: 10000 INJECTION, SOLUTION INTRAVENOUS at 16:06

## 2020-05-27 RX ADMIN — INSULIN DETEMIR SCH UNIT: 100 INJECTION, SOLUTION SUBCUTANEOUS at 21:14

## 2020-05-27 RX ADMIN — IPRATROPIUM BROMIDE AND ALBUTEROL SULFATE SCH ML: .5; 3 SOLUTION RESPIRATORY (INHALATION) at 12:14

## 2020-05-27 RX ADMIN — IPRATROPIUM BROMIDE AND ALBUTEROL SULFATE SCH ML: .5; 3 SOLUTION RESPIRATORY (INHALATION) at 08:12

## 2020-05-27 RX ADMIN — INSULIN ASPART SCH UNIT: 100 INJECTION, SOLUTION INTRAVENOUS; SUBCUTANEOUS at 08:17

## 2020-05-27 RX ADMIN — IPRATROPIUM BROMIDE AND ALBUTEROL SULFATE SCH ML: .5; 3 SOLUTION RESPIRATORY (INHALATION) at 20:41

## 2020-05-27 NOTE — P.PN
Subjective


Progress Note Date: 05/27/20


Principal diagnosis: 


dyspnea, pleural effusion, abnormal chest x-ray





 on 05/27/2020 patient seen in follow-up on selective care unit.  She is awake 

and alert, feeling much better, breathing much easier, she has been diuresed, 

lower extremity edema has improved, and abdominal distention has improved, she 

is currently at 2 L of oxygen with pulse ox between 94 and 99%, no complaint of 

chest pain, sounds are clear, diminished at the bases, no rhonchi, no wheezing, 

no phlegm production. she remains on heparin infusion for elevated troponins, 

and she's had no episodes of chest pain, patient is on IV Lasix at 40 mg every 

12 hours, and she is in -1044 mL fluid balance over the last 24 hours. 

echocardiogram results have been pending. 








Objective





- Vital Signs


Vital signs: 


                                   Vital Signs











Temp  98.9 F   05/27/20 11:55


 


Pulse  85   05/27/20 12:24


 


Resp  19   05/27/20 11:55


 


BP  122/61   05/27/20 11:55


 


Pulse Ox  99   05/27/20 11:55








                                 Intake & Output











 05/26/20 05/27/20 05/27/20





 18:59 06:59 18:59


 


Intake Total  64.563 105.82


 


Output Total 0584 001 6085


 


Balance -1500 -435.437 -1044.18


 


Weight   151.5 kg


 


Intake:   


 


  Intake, IV Titration  64.563 105.82





  Amount   


 


    Heparin Sod,Pork in 0.45%  64.563 105.82





    NaCl 25,000 unit In 0.45   





    % NaCl 1 250ml.bag @ 6.59   





    UNITS/KG/HR 9.984 mls/hr   





    IV .Q24H [facility] Rx#:   





    [id number]   


 


Output:   


 


  Urine 4813 486 4688


 


Other:   


 


  Voiding Method Indwelling Catheter  Indwelling Catheter














- Exam


GENERAL EXAM: Alert, a pleasant, 65-year-old obese white female, currently on 2 

L of oxygen with pulse ox of 97% resting comfortably on the gurney in the 

emergency department


HEAD: Normocephalic/atraumatic.


EYES: Normal reaction of pupils, equal size.  Conjunctiva pink, sclera white.


NOSE: Clear with pink turbinates.


THROAT: No erythema or exudates.


NECK: No masses, no JVD, no thyroid enlargement, no adenopathy.


CHEST: No chest wall deformity.  Symmetrical expansion. 


LUNGS: Equal air entry with no crackles, wheeze, rhonchi or dullness.


CVS: Regular rate and rhythm, normal S1 and S2, no gallops, no murmurs, no rubs


ABDOMEN: Soft, nontender.  No hepatosplenomegaly, normal bowel sounds, no 

guarding or rigidity.


EXTREMITIES: No clubbing, chronic venous stasis changes present to lower 

extremities, suspec t some chronic swelling to lower extremities no cyanosis, 2+

pulses and upper and lower extremities. 2+ lower extremity edema


MUSCULOSKELETAL: Muscle strength and tone normal.


SPINE: No scoliosis or deformity


SKIN: No rashes


CENTRAL NERVOUS SYSTEM: Alert and oriented -3.  No focal deficits, tone is 

normal in all 4 extremities.


PSYCHIATRIC: Alert and oriented -3.  Appropriate affect.  Intact judgment and 

insight.











- Labs


CBC & Chem 7: 


                                 05/26/20 11:59





                                 05/27/20 03:02


Labs: 


                  Abnormal Lab Results - Last 24 Hours (Table)











  05/26/20 05/26/20 05/26/20 Range/Units





  16:21 17:01 21:15 


 


Sodium     (137-145)  mmol/L


 


Chloride     ()  mmol/L


 


BUN     (7-17)  mg/dL


 


Creatinine     (0.52-1.04)  mg/dL


 


Glucose     (74-99)  mg/dL


 


POC Glucose (mg/dL)   179 H  242 H  (75-99)  mg/dL


 


Troponin I  0.037 H*    (0.000-0.034)  ng/mL














  05/27/20 05/27/20 05/27/20 Range/Units





  03:02 07:28 10:14 


 


Sodium  135 L    (137-145)  mmol/L


 


Chloride  97 L    ()  mmol/L


 


BUN  34 H    (7-17)  mg/dL


 


Creatinine  1.18 H    (0.52-1.04)  mg/dL


 


Glucose  199 H    (74-99)  mg/dL


 


POC Glucose (mg/dL)   163 H  198 H  (75-99)  mg/dL


 


Troponin I     (0.000-0.034)  ng/mL














  05/27/20 Range/Units





  12:43 


 


Sodium   (137-145)  mmol/L


 


Chloride   ()  mmol/L


 


BUN   (7-17)  mg/dL


 


Creatinine   (0.52-1.04)  mg/dL


 


Glucose   (74-99)  mg/dL


 


POC Glucose (mg/dL)  216 H  (75-99)  mg/dL


 


Troponin I   (0.000-0.034)  ng/mL














Assessment and Plan


Plan: 


 Assessment:





#1. Acute hypoxemic respiratory failure related to acute exacerbation of 

congestive heart failure with unknown ejection fraction





#2. Rule out non-ST elevated myocardial infarction





#3.  Significant weight gain of around 40 pounds in the last month





#4. Morbid obesity





#5. Lifetime nonsmoker





#6. diabetes mellitus type 2, with diabetic neuropathy





#7.  History of MRSA infection and nonhealing wound in the left leg





#8.  Suspect obstructive sleep apnea, will need outpatient evaluation





Plan:





Continue current medical treatment, continue IV diuretics, echocardiogram 

results are pending, overall patient is breathing easier, feeling better, FiO2 

is currently at 2 L, patient reports intermittent lower extremity edema, and 

abdominal distention.  Follow-up chest x-ray in the morning.  Suspect underlying

obstructive sleep apnea, and patient is recommended to have outpatient evalu

ation with the sleep study. will follow





I performed a history & physical examination of the patient and discussed their 

management with my nurse practitioner, Kiki Rivero.  I reviewed the nurse pr

actitioner's note and agree with the documented findings and plan of care.  Lung

sounds are positive for diminished breath sounds.  The findings and the 

impression was discussed with the patient.  I attest to the documentation by the

nurse practitioner. 





Time with Patient: Less than 30

## 2020-05-27 NOTE — P.PN
Subjective


65-year-old female was admitted with shortness of breath and weight gain of 44 

pounds and  patient is being treated for failure exacerbation patient may have 

congestive heart failure left ventricular dysfunction and/or right-sided heart 

failure and pulmonary hypertension.  Echocardiogram is still pending.  Patient's

swelling in the both legs improved significantly.  Patient remains on Lasix 

patient feels much better today.  But still short of breath





Constitutional: Denied any fatigue denied any fever.


Cardio vascular: denied any chest pain, palpitations


Gastrointestinal denied any nausea vomiting


Pulmonary: As mentioned in HPI


Neurologic denied any new focal deficits





All inpatient medications were reviewed and appropriate changes in these 

medications as dictated in the interval history and assessment and plan.








Objective





- Vital Signs


Vital signs: 


                                   Vital Signs











Temp  98.9 F   05/27/20 11:55


 


Pulse  85   05/27/20 12:24


 


Resp  19   05/27/20 11:55


 


BP  122/61   05/27/20 11:55


 


Pulse Ox  99   05/27/20 11:55








                                 Intake & Output











 05/26/20 05/27/20 05/27/20





 18:59 06:59 18:59


 


Intake Total  64.563 105.82


 


Output Total 9561 241 1754


 


Balance -1500 -435.437 -1044.18


 


Intake:   


 


  Intake, IV Titration  64.563 105.82





  Amount   


 


    Heparin Sod,Pork in 0.45%  64.563 105.82





    NaCl 25,000 unit In 0.45   





    % NaCl 1 250ml.bag @ 6.59   





    UNITS/KG/HR 9.984 mls/hr   





    IV .Q24H [facility] Rx#:   





    [id number]   


 


Output:   


 


  Urine 4211 888 6420


 


Other:   


 


  Voiding Method Indwelling Catheter  Indwelling Catheter














- Exam








PHYSICAL EXAMINATION: 





GENERAL: The patient is alert and oriented x3, not in any acute distress.  Her 

but obese with BMI of around 54


HEENT: Pupils are round and equally reacting to light. EOMI. No scleral icterus.

No conjunctival pallor. Normocephalic, atraumatic. No pharyngeal erythema. No 

thyromegaly. 


CARDIOVASCULAR: S1 and S2 present. No murmurs, rubs, or gallops. 


PULMONARY: Chest is clear to auscultation, no wheezing or crackles. 


ABDOMEN: Soft, nontender, nondistended, normoactive bowel sounds. No palpable 

organomegaly. 


MUSCULOSKELETAL: No joint swelling or deformity.


EXTREMITIES: No cyanosis, clubbing, extensive bilateral pedal edema with chronic

venous stasis and venous stasis dermatosis of both legs with more healed ulcers,

swelling in both legs intermittently improved


NEUROLOGICAL: Gross neurological examination did not reveal any focal deficits. 


SKIN: No rashes. 























- Labs


CBC & Chem 7: 


                                 05/26/20 11:59





                                 05/27/20 03:02


Labs: 


                  Abnormal Lab Results - Last 24 Hours (Table)











  05/26/20 05/26/20 05/26/20 Range/Units





  11:31 11:59 16:21 


 


Sodium   133 L   (137-145)  mmol/L


 


Chloride     ()  mmol/L


 


BUN   30 H   (7-17)  mg/dL


 


Creatinine   1.13 H   (0.52-1.04)  mg/dL


 


Glucose   167 H   (74-99)  mg/dL


 


POC Glucose (mg/dL)     (75-99)  mg/dL


 


AST   39 H   (14-36)  U/L


 


ALT   45 H   (4-34)  U/L


 


Alkaline Phosphatase   146 H   ()  U/L


 


Troponin I  0.042 H*   0.037 H*  (0.000-0.034)  ng/mL


 


Total Protein   6.2 L   (6.3-8.2)  g/dL


 


Albumin   3.4 L   (3.5-5.0)  g/dL














  05/26/20 05/26/20 05/27/20 Range/Units





  17:01 21:15 03:02 


 


Sodium    135 L  (137-145)  mmol/L


 


Chloride    97 L  ()  mmol/L


 


BUN    34 H  (7-17)  mg/dL


 


Creatinine    1.18 H  (0.52-1.04)  mg/dL


 


Glucose    199 H  (74-99)  mg/dL


 


POC Glucose (mg/dL)  179 H  242 H   (75-99)  mg/dL


 


AST     (14-36)  U/L


 


ALT     (4-34)  U/L


 


Alkaline Phosphatase     ()  U/L


 


Troponin I     (0.000-0.034)  ng/mL


 


Total Protein     (6.3-8.2)  g/dL


 


Albumin     (3.5-5.0)  g/dL














  05/27/20 05/27/20 Range/Units





  07:28 10:14 


 


Sodium    (137-145)  mmol/L


 


Chloride    ()  mmol/L


 


BUN    (7-17)  mg/dL


 


Creatinine    (0.52-1.04)  mg/dL


 


Glucose    (74-99)  mg/dL


 


POC Glucose (mg/dL)  163 H  198 H  (75-99)  mg/dL


 


AST    (14-36)  U/L


 


ALT    (4-34)  U/L


 


Alkaline Phosphatase    ()  U/L


 


Troponin I    (0.000-0.034)  ng/mL


 


Total Protein    (6.3-8.2)  g/dL


 


Albumin    (3.5-5.0)  g/dL














Assessment and Plan


Plan: 


Bilateral pedal edema shortness of breath patient most probably has severe 

pulmonary hypertension from undiagnosed sleep apnea including to extensive pedal

edema fluid retention and right-sided heart failure I cannot completely rule out

left-sided heart failure echocardiogram is pending continue with IV Lasix.  

Respiratory status improved.  Her edema improved significantly


-Hyponatremia hypervolemic hyponatremia improving with IV Lasix


-Chronic kidney disease stage III secondary to diabetic nephropathy


-Mildly elevated troponin secondary to shortness of breath


-Mildly elevated liver enzymes secondary to hepatic congestion


-Obesity with sleep apnea and possible based stay hypoventilation syndrome


-Type 2 diabetes mellitus: The patient will be resumed on home regimen along 

with sliding scale


-DVT prophylaxis with Lovenox

## 2020-05-27 NOTE — P.PN
Subjective


Progress Note Date: 05/27/20


Principal diagnosis: 





Shortness of breath/mildly abnormal troponin





This is a very pleasant 65-year-old female patient with a past medical history 

significant for morbid obesity, diabetes, hypertension, and dyslipidemia as well

as tonic lower extremities edema.  The patient was in her usual state of altered

about 3 weeks ago when she started experiencing progressive exertional dyspnea 

associated with progressive lower extremities edema.  She stated that she gained

50 pounds within one month.  No symptoms of chest pain or chest discomfort, 

dizziness, heart racing, or syncope.  The patient ever being told in the past 

that she does have congestive heart failure.  The patient never seen any 

cardiologist as an outpatient.  No history of coronary artery disease or any 

cardiac arrhythmia.  On physical examination she does have severe bilateral 

lower extremities edema was chronic skin changes.  The EKG showed sinus rhythm 

without any significant ST or T-wave abnormalities but nonspecific changes.  





The patient was seen today, May 27 of 2020.  She stated that she is feeling 

better interval shortness of breath.  No chest pain or chest discomfort.  The 

troponin is slightly elevated.  She is on heparin IV.  She is also on Lasix 

which I would continue for additional 24 hours.  We are waiting for the 

echocardiogram results.








Objective





- Vital Signs


Vital signs: 


                                   Vital Signs











Temp  98.9 F   05/27/20 11:55


 


Pulse  85   05/27/20 12:24


 


Resp  19   05/27/20 11:55


 


BP  122/61   05/27/20 11:55


 


Pulse Ox  99   05/27/20 11:55








                                 Intake & Output











 05/26/20 05/27/20 05/27/20





 18:59 06:59 18:59


 


Intake Total  64.563 105.82


 


Output Total 0296 430 4431


 


Balance -1500 -435.437 -1044.18


 


Weight   151.5 kg


 


Intake:   


 


  Intake, IV Titration  64.563 105.82





  Amount   


 


    Heparin Sod,Pork in 0.45%  64.563 105.82





    NaCl 25,000 unit In 0.45   





    % NaCl 1 250ml.bag @ 6.59   





    UNITS/KG/HR 9.984 mls/hr   





    IV .Q24H [facility] Rx#:   





    [id number]   


 


Output:   


 


  Urine 4618 181 5819


 


Other:   


 


  Voiding Method Indwelling Catheter  Indwelling Catheter














- Constitutional


General appearance: Present: no acute distress





- Respiratory


Respiratory: bilateral: diminished





- Cardiovascular


Rhythm: regular


Heart sounds: normal: S1, S2





- Labs


CBC & Chem 7: 


                                 05/26/20 11:59





                                 05/27/20 03:02


Labs: 


                  Abnormal Lab Results - Last 24 Hours (Table)











  05/26/20 05/26/20 05/26/20 Range/Units





  16:21 17:01 21:15 


 


Sodium     (137-145)  mmol/L


 


Chloride     ()  mmol/L


 


BUN     (7-17)  mg/dL


 


Creatinine     (0.52-1.04)  mg/dL


 


Glucose     (74-99)  mg/dL


 


POC Glucose (mg/dL)   179 H  242 H  (75-99)  mg/dL


 


Troponin I  0.037 H*    (0.000-0.034)  ng/mL














  05/27/20 05/27/20 05/27/20 Range/Units





  03:02 07:28 10:14 


 


Sodium  135 L    (137-145)  mmol/L


 


Chloride  97 L    ()  mmol/L


 


BUN  34 H    (7-17)  mg/dL


 


Creatinine  1.18 H    (0.52-1.04)  mg/dL


 


Glucose  199 H    (74-99)  mg/dL


 


POC Glucose (mg/dL)   163 H  198 H  (75-99)  mg/dL


 


Troponin I     (0.000-0.034)  ng/mL














  05/27/20 Range/Units





  12:43 


 


Sodium   (137-145)  mmol/L


 


Chloride   ()  mmol/L


 


BUN   (7-17)  mg/dL


 


Creatinine   (0.52-1.04)  mg/dL


 


Glucose   (74-99)  mg/dL


 


POC Glucose (mg/dL)  216 H  (75-99)  mg/dL


 


Troponin I   (0.000-0.034)  ng/mL














Assessment and Plan


Assessment: 





Assessment


#1 congestive heart failure exacerbation of unknown etiology


#2 mildly abnormal cardiac enzymes


#3 diabetes


#4 multiple comorbid conditions





Plan


#1 continue the current medical regimen


#2 continue heparin IV


#3 continue the Lasix IV


#4 awaiting the echocardiogram results


#5 possible coronary angiogram

## 2020-05-27 NOTE — ECHOF
Referral Reason:Possible NSTEMI



MEASUREMENTS

--------

HEIGHT: 167.6 cm

WEIGHT: 151.5 kg

BP: 110/77

IVSd:   1.5 cm     (0.6 - 1.1)

LVIDd:   6.0 cm     (3.9 - 5.3)

LVPWd:   1.5 cm     (0.6 - 1.1)

IVSs:   1.9 cm

LVIDs:   3.8 cm

LVPWs:   1.8 cm

LA Diam:   3.8 cm     (2.7 - 3.8)

RVIDd:   3.4 cm     (< 3.3)

LAESV Index (A-L):   25.13 ml/m

Ao Diam:   3.2 cm     (2.0 - 3.7)

AV Cusp:   2.4 cm     (1.5 - 2.6)

EPSS:   1.2 cm

MV E Geovani:   1.18 m/s

MV DecT:   207 ms

MV A Geovani:   0.75 m/s

MV E/A Ratio:   1.57 

RAP:   5.00 mmHg

RVSP:   33.54 mmHg

MV EF SLOPE:   79.41 mm/s     (70 - 150)

MV EXCURSION:   17.72 mm     (> 18.000)







FINDINGS

--------

Sinus rhythm.

This was a technically difficult study with suboptimal views.

The left ventricle is moderately dilated.   There is moderate concentric left ventricular hypertrophy
.   Overall left ventricular systolic function is severely impaired with, an EF between 25 - 30 %.

The right ventricle is mildly enlarged.

Normal LA  size by volume 22+/-6 ml/m2.

The right atrium was not well visualized.

5.0mg of Lumason was utilized for enhancement of images

Interatrial and interventricular septum intact.

The aortic valve was not well visualized.

The mitral valve leaflets are mildly thickened.   Mild mitral annular calcification present.   Mild m
itral regurgitation is present.

Mild tricuspid regurgitation present.   There is borderline pulmonary artery hypertension.   The righ
t ventricular systolic pressure, as measured by Doppler, is 33.54mmHg.

Trace/mild (physiologic)  pulmonic regurgitation.

The aortic root size is normal.

Normal inferior vena cava with normal inspiratory collapse consistent with estimated right atrial pre
ssure of  5 mmHg.

There is a trivial pericardial effusion present.



CONCLUSIONS

--------

1. Sinus rhythm.

2. This was a technically difficult study with suboptimal views.

3. The left ventricle is moderately dilated.

4. There is moderate concentric left ventricular hypertrophy.

5. Overall left ventricular systolic function is severely impaired with, an EF between 25 - 30 %.

6. The right ventricle is mildly enlarged.

7. Normal LA size by volume 22+/-6 ml/m2.

8. The right atrium was not well visualized.

9. 5.0mg of Lumason was utilized for enhancement of images

10. Interatrial and interventricular septum intact.

11. The aortic valve was not well visualized.

12. The mitral valve leaflets are mildly thickened.

13. Mild mitral annular calcification present.

14. Mild mitral regurgitation is present.

15. Mild tricuspid regurgitation present.

16. There is borderline pulmonary artery hypertension.

17. The right ventricular systolic pressure, as measured by Doppler, is 33.54mmHg.

18. Trace/mild (physiologic)  pulmonic regurgitation.

19. The aortic root size is normal.

20. Normal inferior vena cava with normal inspiratory collapse consistent with estimated right atrial
 pressure of  5 mmHg.

21. There is a trivial pericardial effusion present.





SONOGRAPHER: Michelle Contreras RDCS

## 2020-05-28 LAB
ANION GAP SERPL CALC-SCNC: 7 MMOL/L
BUN SERPL-SCNC: 32 MG/DL (ref 7–17)
CALCIUM SPEC-MCNC: 8.6 MG/DL (ref 8.4–10.2)
CHLORIDE SERPL-SCNC: 101 MMOL/L (ref 98–107)
CO2 SERPL-SCNC: 32 MMOL/L (ref 22–30)
GLUCOSE BLD-MCNC: 138 MG/DL (ref 75–99)
GLUCOSE BLD-MCNC: 200 MG/DL (ref 75–99)
GLUCOSE BLD-MCNC: 87 MG/DL (ref 75–99)
GLUCOSE BLD-MCNC: 87 MG/DL (ref 75–99)
GLUCOSE SERPL-MCNC: 68 MG/DL (ref 74–99)
POTASSIUM SERPL-SCNC: 4.2 MMOL/L (ref 3.5–5.1)
SODIUM SERPL-SCNC: 140 MMOL/L (ref 137–145)

## 2020-05-28 RX ADMIN — HEPARIN SODIUM SCH MLS/HR: 10000 INJECTION, SOLUTION INTRAVENOUS at 03:19

## 2020-05-28 RX ADMIN — FUROSEMIDE SCH MG: 10 INJECTION, SOLUTION INTRAMUSCULAR; INTRAVENOUS at 09:52

## 2020-05-28 RX ADMIN — LISINOPRIL SCH MG: 10 TABLET ORAL at 09:53

## 2020-05-28 RX ADMIN — FUROSEMIDE SCH MLS/HR: 10 INJECTION, SOLUTION INTRAMUSCULAR; INTRAVENOUS at 15:55

## 2020-05-28 RX ADMIN — METOPROLOL TARTRATE SCH MG: 25 TABLET, FILM COATED ORAL at 09:52

## 2020-05-28 RX ADMIN — ASPIRIN 81 MG CHEWABLE TABLET SCH: 81 TABLET CHEWABLE at 13:23

## 2020-05-28 RX ADMIN — INSULIN ASPART SCH: 100 INJECTION, SOLUTION INTRAVENOUS; SUBCUTANEOUS at 06:35

## 2020-05-28 RX ADMIN — IPRATROPIUM BROMIDE AND ALBUTEROL SULFATE SCH ML: .5; 3 SOLUTION RESPIRATORY (INHALATION) at 11:54

## 2020-05-28 RX ADMIN — INSULIN ASPART SCH: 100 INJECTION, SOLUTION INTRAVENOUS; SUBCUTANEOUS at 06:36

## 2020-05-28 RX ADMIN — IPRATROPIUM BROMIDE AND ALBUTEROL SULFATE SCH ML: .5; 3 SOLUTION RESPIRATORY (INHALATION) at 08:52

## 2020-05-28 RX ADMIN — INSULIN DETEMIR SCH UNIT: 100 INJECTION, SOLUTION SUBCUTANEOUS at 20:07

## 2020-05-28 RX ADMIN — IPRATROPIUM BROMIDE AND ALBUTEROL SULFATE SCH ML: .5; 3 SOLUTION RESPIRATORY (INHALATION) at 19:20

## 2020-05-28 RX ADMIN — OXYBUTYNIN CHLORIDE SCH MG: 10 TABLET, EXTENDED RELEASE ORAL at 09:53

## 2020-05-28 RX ADMIN — ASPIRIN 81 MG CHEWABLE TABLET SCH MG: 81 TABLET CHEWABLE at 09:52

## 2020-05-28 RX ADMIN — METOPROLOL TARTRATE SCH MG: 25 TABLET, FILM COATED ORAL at 20:07

## 2020-05-28 RX ADMIN — HEPARIN SODIUM SCH MLS/HR: 10000 INJECTION, SOLUTION INTRAVENOUS at 15:56

## 2020-05-28 RX ADMIN — IPRATROPIUM BROMIDE AND ALBUTEROL SULFATE SCH ML: .5; 3 SOLUTION RESPIRATORY (INHALATION) at 15:26

## 2020-05-28 RX ADMIN — INSULIN ASPART SCH UNIT: 100 INJECTION, SOLUTION INTRAVENOUS; SUBCUTANEOUS at 20:07

## 2020-05-28 RX ADMIN — INSULIN ASPART SCH: 100 INJECTION, SOLUTION INTRAVENOUS; SUBCUTANEOUS at 12:25

## 2020-05-28 RX ADMIN — INSULIN ASPART SCH UNIT: 100 INJECTION, SOLUTION INTRAVENOUS; SUBCUTANEOUS at 18:05

## 2020-05-28 NOTE — XR
EXAMINATION TYPE: XR chest 1V portable

 

DATE OF EXAM: 5/28/2020

 

Comparison: 5/26/2020

 

Clinical History: 65-year-old female follow up shortness of breath

 

Findings:

Heart borderline enlarged. Mild interstitial prominence. Left base underpenetrated and not well asses
sed.

 

 

Impression:

Borderline heart size. Interstitial prominence probably technical due to magnification from large pat
ient body habitus. Left base is underpenetrated and not adequately assessed.

## 2020-05-28 NOTE — P.PN
Subjective


Progress Note Date: 05/28/20





This is a very pleasant 65-year-old female patient with a past medical history 

significant for morbid obesity, diabetes, hypertension, and dyslipidemia as well

as tonic lower extremities edema.  She initially presented to the hospital with 

symptoms of progressively worsening shortness of breath and significant weight 

gain.  She was seen in consultation yesterday by Dr. Reddy, and initiated on IV 

Lasix.  He diuresed very well through the night last night, 4000 output.  Blood 

pressure 124/70 with a heart rate in the 70s to 80s, 91% on room air.  Sodium 

140, potassium 4.2, BUN 32, creatinine 1.0.  Patient continues to have a 

significant amount of bilateral peripheral edema.  We will discontinue the IV 

push Lasix and start her on a Lasix drip today.  Continue to monitor closely her

intake and output along with daily weights and daily lytes BUN and creatinine.  

She also had an echo to Doppler performed which revealed an ejection fraction of

25-30%.  Dr. Reddy had a lengthy discussion with the patient regarding cardiac 

catheterization as well as the risks and the benefits.  We will tentatively s

chedule this for tomorrow.





Objective





- Vital Signs


Vital signs: 


                                   Vital Signs











Temp  97.8 F   05/28/20 08:00


 


Pulse  80   05/28/20 12:04


 


Resp  18   05/28/20 12:00


 


BP  124/70   05/28/20 12:00


 


Pulse Ox  91 L  05/28/20 12:00








                                 Intake & Output











 05/27/20 05/28/20 05/28/20





 18:59 06:59 18:59


 


Intake Total 410.437 663.947 364.299


 


Output Total 1650 3500 2000


 


Balance -1239.563 -2836.053 -1635.701


 


Weight 151.5 kg 153.5 kg 


 


Intake:   


 


  Intake, IV Titration 185.437 213.947 124.299





  Amount   


 


    Heparin Sod,Pork in 0.45% 185.437 213.947 124.299





    NaCl 25,000 unit In 0.45   





    % NaCl 1 250ml.bag @ 6.59   





    UNITS/KG/HR 9.984 mls/hr   





    IV .Q24H [facility] Rx#:   





    [id number]   


 


  Oral 225 450 240


 


Output:   


 


  Urine 1650 3500 2000


 


Other:   


 


  Voiding Method Indwelling Catheter Indwelling Catheter Indwelling Catheter














- Exam





GENERAL EXAM: Alert, a pleasant, 65-year-old obese white female, no acute 

distress at the time of my examination


HEAD: Normocephalic/atraumatic.


EYES: Normal reaction of pupils, equal size.  Conjunctiva pink, sclera white.


NOSE: Clear with pink turbinates.


THROAT: No erythema or exudates.


NECK: No masses, no JVD, no thyroid enlargement, no adenopathy.


CHEST: No chest wall deformity.  Symmetrical expansion. 


LUNGS: Equal air entry with no crackles, wheeze, rhonchi or dullness.  

Diminished to the bases.


CVS: Regular rate and rhythm, normal S1 and S2, no gallops, no murmurs, no rubs


ABDOMEN: Soft, obese, nontender.  No hepatosplenomegaly, normal bowel sounds, no

guarding or rigidity.


EXTREMITIES: No clubbing, chronic venous stasis changes present to lower 

extremities, suspect some chronic swelling to lower extremities no cyanosis, 2+ 

pulses and upper and lower extremities. 2+ lower extremity edema


MUSCULOSKELETAL: Muscle strength and tone normal.


SPINE: No scoliosis or deformity


SKIN: No rashes


CENTRAL NERVOUS SYSTEM: Alert and oriented -3.  No focal deficits, tone is 

normal in all 4 extremities.


PSYCHIATRIC: Alert and oriented -3.  Appropriate affect.  Intact judgment and 

insight.











- Labs


CBC & Chem 7: 


                                 05/26/20 11:59





                                 05/28/20 06:36


Labs: 


                  Abnormal Lab Results - Last 24 Hours (Table)











  05/27/20 05/27/20 05/27/20 Range/Units





  15:25 16:49 20:16 


 


APTT  55.4 H    (22.0-30.0)  sec


 


Carbon Dioxide     (22-30)  mmol/L


 


BUN     (7-17)  mg/dL


 


Glucose     (74-99)  mg/dL


 


POC Glucose (mg/dL)   192 H  187 H  (75-99)  mg/dL














  05/28/20 05/28/20 Range/Units





  06:36 06:36 


 


APTT   68.6 H  (22.0-30.0)  sec


 


Carbon Dioxide  32 H   (22-30)  mmol/L


 


BUN  32 H   (7-17)  mg/dL


 


Glucose  68 L   (74-99)  mg/dL


 


POC Glucose (mg/dL)    (75-99)  mg/dL














Assessment and Plan


Plan: 





 Assessment and plan:





#1. Acute hypoxemic respiratory failure related to acute exacerbation of 

congestive heart failure systolic acute on chronic, ejection fraction 25-30%





#2.  Possible non-ST elevated myocardial infarction





#3.  Significant weight gain of around 40 pounds in the last month





#4. Morbid obesity





#5. Lifetime nonsmoker





#6. diabetes mellitus type 2, with diabetic neuropathy





#7.  History of MRSA infection and nonhealing wound in the left leg








Plan


We will discontinue the IV push Lasix and start the patient on a Lasix drip 

today.  Continue to monitor the intake and output along with daily weights and 

daily lytes BUN and creatinine.  Patient also will be scheduled to undergo 

cardiac catheterization tomorrow with Dr. Reddy, the risks and the benefits were

explained in detail and she is willing to proceed.





DNP note has been reviewed, I agree with a documented findings and plan of care.

 Patient was seen and examined.

## 2020-05-28 NOTE — P.PN
Subjective


Progress Note Date: 05/28/20


Principal diagnosis: 


dyspnea, pleural effusion, abnormal chest x-ray





 on 05/27/2020 patient seen in follow-up on selective care unit.  She is awake 

and alert, feeling much better, breathing much easier, she has been diuresed, 

lower extremity edema has improved, and abdominal distention has improved, she 

is currently at 2 L of oxygen with pulse ox between 94 and 99%, no complaint of 

chest pain, sounds are clear, diminished at the bases, no rhonchi, no wheezing, 

no phlegm production. she remains on heparin infusion for elevated troponins, 

and she's had no episodes of chest pain, patient is on IV Lasix at 40 mg every 

12 hours, and she is in -1044 mL fluid balance over the last 24 hours. 

echocardiogram results have been pending. 





On 05/28/2020 patient seen in follow-up on selective care unit.  Feeling much 

better, breathing much easier, lung sounds are clear on today's examination.  

Today's chest x-ray was reviewed showing mild interstitial prominence, patient 

has been diuresed, her abdominal distention abdominal wall edema and lower 

extremity edema has improved.  She is in -1.3 L over the last 24 hours.  His 

labs have been reviewed, showing sodium of 140, potassium 4.2, chloride is 1, 

CO2 32, BUN 32, creatinine is 1.01.  Hemodynamically patient is stable, she is 

in sinus mechanism with a controlled rate of 77 BPM.  Echocardiogram has been 

completed, showing severely impaired left ventricular systolic function with an 

EF between 25-30% mild MR, mild TR, borderline pulmonary artery hypertension, 

right-sided pressures of 33.5 mmHg.  Troponin is slightly elevated, cardiology 

is following, and is considering possibility of coronary angiogram.  She remains

on heparin infusion per weight-based protocol, she has also been started on 

Lasix at 10 mg per hour.








Objective





- Vital Signs


Vital signs: 


                                   Vital Signs











Temp  97.8 F   05/28/20 08:00


 


Pulse  80   05/28/20 12:04


 


Resp  18   05/28/20 12:00


 


BP  124/70   05/28/20 12:00


 


Pulse Ox  91 L  05/28/20 12:00








                                 Intake & Output











 05/27/20 05/28/20 05/28/20





 18:59 06:59 18:59


 


Intake Total 410.437 663.947 604.299


 


Output Total 1650 3500 2000


 


Balance -1239.563 -2836.053 -1395.701


 


Weight 151.5 kg 153.5 kg 


 


Intake:   


 


  Intake, IV Titration 185.437 213.947 124.299





  Amount   


 


    Heparin Sod,Pork in 0.45% 185.437 213.947 124.299





    NaCl 25,000 unit In 0.45   





    % NaCl 1 250ml.bag @ 6.59   





    UNITS/KG/HR 9.984 mls/hr   





    IV .Q24H [facility] Rx#:   





    [id number]   


 


  Oral 225 450 480


 


Output:   


 


  Urine 1650 3500 2000


 


Other:   


 


  Voiding Method Indwelling Catheter Indwelling Catheter Indwelling Catheter


 


  # Voids   0














- Exam


GENERAL EXAM: Alert, a pleasant, 65-year-old obese white female, currently on 2 

L of oxygen with pulse ox of 97% resting comfortably on the gurney in the 

emergency department


HEAD: Normocephalic/atraumatic.


EYES: Normal reaction of pupils, equal size.  Conjunctiva pink, sclera white.


NOSE: Clear with pink turbinates.


THROAT: No erythema or exudates.


NECK: No masses, no JVD, no thyroid enlargement, no adenopathy.


CHEST: No chest wall deformity.  Symmetrical expansion. 


LUNGS: Equal air entry with no crackles, wheeze, rhonchi or dullness.


CVS: Regular rate and rhythm, normal S1 and S2, no gallops, no murmurs, no rubs


ABDOMEN: Soft, nontender.  No hepatosplenomegaly, normal bowel sounds, no 

guarding or rigidity.


EXTREMITIES: No clubbing, chronic venous stasis changes present to lower 

extremities, suspec t some chronic swelling to lower extremities no cyanosis, 2+

pulses and upper and lower extremities. 2+ lower extremity edema


MUSCULOSKELETAL: Muscle strength and tone normal.


SPINE: No scoliosis or deformity


SKIN: No rashes


CENTRAL NERVOUS SYSTEM: Alert and oriented -3.  No focal deficits, tone is 

normal in all 4 extremities.


PSYCHIATRIC: Alert and oriented -3.  Appropriate affect.  Intact judgment and 

insight.











- Labs


CBC & Chem 7: 


                                 05/26/20 11:59





                                 05/28/20 06:36


Labs: 


                  Abnormal Lab Results - Last 24 Hours (Table)











  05/27/20 05/27/20 05/27/20 Range/Units





  15:25 16:49 20:16 


 


APTT  55.4 H    (22.0-30.0)  sec


 


Carbon Dioxide     (22-30)  mmol/L


 


BUN     (7-17)  mg/dL


 


Glucose     (74-99)  mg/dL


 


POC Glucose (mg/dL)   192 H  187 H  (75-99)  mg/dL














  05/28/20 05/28/20 Range/Units





  06:36 06:36 


 


APTT   68.6 H  (22.0-30.0)  sec


 


Carbon Dioxide  32 H   (22-30)  mmol/L


 


BUN  32 H   (7-17)  mg/dL


 


Glucose  68 L   (74-99)  mg/dL


 


POC Glucose (mg/dL)    (75-99)  mg/dL














Assessment and Plan


Plan: 


 Assessment:





#1. Acute hypoxemic respiratory failure related to acute exacerbation of 

congestive heart failure with severely impaired left ventricular systolic 

function and EF of 25-30%





#2. Rule out non-ST elevated myocardial infarction, patient presented with 

mildly abnormal cardiac enzymes, cardiology is following





#3.  Significant weight gain of around 40 pounds in the last month





#4. Morbid obesity





#5. Lifetime nonsmoker





#6. diabetes mellitus type 2, with diabetic neuropathy





#7.  History of MRSA infection and nonhealing wound in the left leg





#8.  Suspect obstructive sleep apnea, will need outpatient evaluation





Plan:





Continue diuretics per cardiology recommendations, patient has been diuresed, 

she is breathing easier, she still has significant edema involving her lower 

extremities, and abdomen, she is being started on Lasix infusion, echocardiogram

results have been noted, patient has severely impaired left ventricular systolic

function.  Overall she is breathing easier, no complaints of chest pain, 

cardiology is following and is considering coronary angiogram.  From pulmonary 

perspective patient will need outpatient follow-up in the office and outpatient 

evaluation for suspected obstructive sleep apnea





I performed a history & physical examination of the patient and discussed their 

management with my nurse practitioner, Kiki Rivero.  I reviewed the nurse 

practitioner's note and agree with the documented findings and plan of care.  

Lung sounds are positive for diminished breath sounds.  The findings and the 

impression was discussed with the patient.  I attest to the documentation by the

nurse practitioner. 





Time with Patient: Less than 30

## 2020-05-28 NOTE — P.PN
Subjective


Progress Note Date: 05/28/20


Principal diagnosis: 





65-year-old female was admitted with shortness of breath and weight gain of 44 

pounds and  patient is being treated for heart failure exacerbation patient may 

have congestive heart failure left ventricular dysfunction and/or right-sided 

heart failure and pulmonary hypertension.  Echocardiogram is still pending.  

Patient's swelling in the both legs improved significantly.  Patient remains on 

Lasix patient feels much better today.  But still short of breath





Constitutional: Denied any fatigue denied any fever.


Cardio vascular: denied any chest pain, palpitations


Gastrointestinal denied any nausea vomiting


Pulmonary: As mentioned in HPI


Neurologic denied any new focal deficits





All inpatient medications were reviewed and appropriate changes in these me

dications as dictated in the interval history and assessment and plan.





05/28/2020


Patient is seen and evaluated in follow-up currently sitting up in the chair 

with bilateral lower extremities elevated that are quite edematous although 

patient states this is improved from yesterday.  Patient remains on Lasix and 

will continue at this time.  Cardiology and pulmonary following.  Patient states

her shortness of breath has resolved and patient is currently on room air.  She 

does not normally wear oxygen in the home setting.  Patient currently remains on

a heparin drip and will continue at this time.  Creatinine slightly improved at 

1.01, sodium is 140, covid testing was negative.  PT/OT worked with the patient 

requiring some minimal assistance today although continues to need some 

assistance with the use of a walker.  Patient is refusing rehab upon discharge. 

No reports of chest pain, shortness of breath, or palpitations.  Patient is 

afebrile.  No reports of nausea or vomiting and patient is tolerating diet.











Objective





- Vital Signs


Vital signs: 


                                   Vital Signs











Temp  97.8 F   05/28/20 08:00


 


Pulse  81   05/28/20 15:38


 


Resp  16   05/28/20 15:38


 


BP  124/70   05/28/20 12:00


 


Pulse Ox  92 L  05/28/20 15:26








                                 Intake & Output











 05/27/20 05/28/20 05/28/20





 18:59 06:59 18:59


 


Intake Total 410.437 663.947 604.299


 


Output Total 1650 3500 2000


 


Balance -1239.563 -2836.053 -1395.701


 


Weight 151.5 kg 153.5 kg 


 


Intake:   


 


  Intake, IV Titration 185.437 213.947 124.299





  Amount   


 


    Heparin Sod,Pork in 0.45% 185.437 213.947 124.299





    NaCl 25,000 unit In 0.45   





    % NaCl 1 250ml.bag @ 6.59   





    UNITS/KG/HR 9.984 mls/hr   





    IV .Q24H Count includes the [facility] Rx#:   





    [id number]   


 


  Oral 225 450 480


 


Output:   


 


  Urine 1650 3500 2000


 


Other:   


 


  Voiding Method Indwelling Catheter Indwelling Catheter Indwelling Catheter


 


  # Voids   0














- Exam





GENERAL: The patient is alert and oriented x3, not in any acute distress.  

Morbidly obese with BMI of 54.6


HEENT: Pupils are round and equally reacting to light. EOMI. No scleral icterus.

No conjunctival pallor. Normocephalic, atraumatic. No pharyngeal erythema. No 

thyromegaly. 


CARDIOVASCULAR: S1 and S2 present. No murmurs, rubs, or gallops. 


PULMONARY: Chest is clear to auscultation, no wheezing or crackles. 


ABDOMEN: Soft, nontender, nondistended, normoactive bowel sounds. No palpable 

organomegaly.  Farmer catheter noted


MUSCULOSKELETAL: No joint swelling or deformity.


EXTREMITIES: No cyanosis, clubbing, extensive bilateral pedal edema with chronic

venous stasis and venous stasis dermatosis of both legs with more healed ulcers,

swelling in both legs intermittently improved


NEUROLOGICAL: Gross neurological examination did not reveal any focal deficits. 


SKIN: No rashes. 








- Labs


CBC & Chem 7: 


                                 05/26/20 11:59





                                 05/28/20 06:36


Labs: 


                  Abnormal Lab Results - Last 24 Hours (Table)











  05/27/20 05/27/20 05/27/20 Range/Units





  15:25 16:49 20:16 


 


APTT  55.4 H    (22.0-30.0)  sec


 


Carbon Dioxide     (22-30)  mmol/L


 


BUN     (7-17)  mg/dL


 


Glucose     (74-99)  mg/dL


 


POC Glucose (mg/dL)   192 H  187 H  (75-99)  mg/dL














  05/28/20 05/28/20 Range/Units





  06:36 06:36 


 


APTT   68.6 H  (22.0-30.0)  sec


 


Carbon Dioxide  32 H   (22-30)  mmol/L


 


BUN  32 H   (7-17)  mg/dL


 


Glucose  68 L   (74-99)  mg/dL


 


POC Glucose (mg/dL)    (75-99)  mg/dL














Assessment and Plan


Assessment: 





-Bilateral pedal edema shortness of breath patient most probably has severe 

pulmonary hypertension from undiagnosed sleep apnea including to extensive pedal

edema fluid retention and right-sided heart failure I cannot completely rule out

left-sided heart failure.  Echo report shows moderate concentric left ventric

ular hypertrophy with overall left ventricular systolic function is severely 

impaired with an EF of 25-30% with trivial pericardial effusion present along 

with borderline pulmonary artery hypertension, mild mitral and tricuspid 

regurgitation also present.  Patient will be initiated on a Lasix drip and 

continued heparin.  Patient scheduled to undergo possible cardiac 

catheterization tomorrow morning


-Acute exacerbation of congestive heart failure, acute on chronic systolic with 

an ejection fraction of 25-30%


-Hyponatremia hypervolemic hyponatremia improving with IV Lasix


-Chronic kidney disease stage III secondary to diabetic nephropathy


-Mildly elevated troponin secondary to shortness of breath


-Mildly elevated liver enzymes secondary to hepatic congestion


-Obesity with sleep apnea and possible based stay hypoventilation syndrome


-Type 2 diabetes mellitus: The patient will be resumed on home regimen along 

with sliding scale


-DVT prophylaxis with heparin drip

## 2020-05-29 LAB
ANION GAP SERPL CALC-SCNC: 7 MMOL/L
BUN SERPL-SCNC: 30 MG/DL (ref 7–17)
CALCIUM SPEC-MCNC: 8.8 MG/DL (ref 8.4–10.2)
CHLORIDE SERPL-SCNC: 95 MMOL/L (ref 98–107)
CO2 SERPL-SCNC: 37 MMOL/L (ref 22–30)
GLUCOSE BLD-MCNC: 103 MG/DL (ref 75–99)
GLUCOSE BLD-MCNC: 110 MG/DL (ref 75–99)
GLUCOSE BLD-MCNC: 154 MG/DL (ref 75–99)
GLUCOSE BLD-MCNC: 66 MG/DL (ref 75–99)
GLUCOSE BLD-MCNC: 85 MG/DL (ref 75–99)
GLUCOSE SERPL-MCNC: 68 MG/DL (ref 74–99)
POTASSIUM SERPL-SCNC: 4.7 MMOL/L (ref 3.5–5.1)
SODIUM SERPL-SCNC: 139 MMOL/L (ref 137–145)

## 2020-05-29 PROCEDURE — B2111ZZ FLUOROSCOPY OF MULTIPLE CORONARY ARTERIES USING LOW OSMOLAR CONTRAST: ICD-10-PCS

## 2020-05-29 PROCEDURE — 4A023N7 MEASUREMENT OF CARDIAC SAMPLING AND PRESSURE, LEFT HEART, PERCUTANEOUS APPROACH: ICD-10-PCS

## 2020-05-29 PROCEDURE — 027136Z DILATION OF CORONARY ARTERY, TWO ARTERIES WITH THREE DRUG-ELUTING INTRALUMINAL DEVICES, PERCUTANEOUS APPROACH: ICD-10-PCS

## 2020-05-29 RX ADMIN — INSULIN DETEMIR SCH UNIT: 100 INJECTION, SOLUTION SUBCUTANEOUS at 21:01

## 2020-05-29 RX ADMIN — METOPROLOL TARTRATE SCH MG: 25 TABLET, FILM COATED ORAL at 21:01

## 2020-05-29 RX ADMIN — FUROSEMIDE SCH MG: 10 INJECTION, SOLUTION INTRAMUSCULAR; INTRAVENOUS at 21:01

## 2020-05-29 RX ADMIN — HEPARIN SODIUM SCH MLS/HR: 10000 INJECTION, SOLUTION INTRAVENOUS at 10:00

## 2020-05-29 RX ADMIN — MIDAZOLAM ONE MG: 1 INJECTION INTRAMUSCULAR; INTRAVENOUS at 12:02

## 2020-05-29 RX ADMIN — HEPARIN SODIUM SCH MLS/HR: 10000 INJECTION, SOLUTION INTRAVENOUS at 03:09

## 2020-05-29 RX ADMIN — LISINOPRIL SCH MG: 10 TABLET ORAL at 07:51

## 2020-05-29 RX ADMIN — INSULIN ASPART SCH: 100 INJECTION, SOLUTION INTRAVENOUS; SUBCUTANEOUS at 12:51

## 2020-05-29 RX ADMIN — OXYBUTYNIN CHLORIDE SCH MG: 10 TABLET, EXTENDED RELEASE ORAL at 07:51

## 2020-05-29 RX ADMIN — INSULIN ASPART SCH: 100 INJECTION, SOLUTION INTRAVENOUS; SUBCUTANEOUS at 20:59

## 2020-05-29 RX ADMIN — IPRATROPIUM BROMIDE AND ALBUTEROL SULFATE SCH ML: .5; 3 SOLUTION RESPIRATORY (INHALATION) at 19:59

## 2020-05-29 RX ADMIN — FUROSEMIDE SCH MLS/HR: 10 INJECTION, SOLUTION INTRAMUSCULAR; INTRAVENOUS at 03:08

## 2020-05-29 RX ADMIN — VERAPAMIL HYDROCHLORIDE ONE ML: 2.5 INJECTION, SOLUTION INTRAVENOUS at 12:11

## 2020-05-29 RX ADMIN — VERAPAMIL HYDROCHLORIDE ONE ML: 2.5 INJECTION, SOLUTION INTRAVENOUS at 11:17

## 2020-05-29 RX ADMIN — IPRATROPIUM BROMIDE AND ALBUTEROL SULFATE SCH ML: .5; 3 SOLUTION RESPIRATORY (INHALATION) at 16:09

## 2020-05-29 RX ADMIN — INSULIN ASPART SCH: 100 INJECTION, SOLUTION INTRAVENOUS; SUBCUTANEOUS at 06:23

## 2020-05-29 RX ADMIN — IPRATROPIUM BROMIDE AND ALBUTEROL SULFATE SCH ML: .5; 3 SOLUTION RESPIRATORY (INHALATION) at 07:55

## 2020-05-29 RX ADMIN — IPRATROPIUM BROMIDE AND ALBUTEROL SULFATE SCH: .5; 3 SOLUTION RESPIRATORY (INHALATION) at 11:21

## 2020-05-29 RX ADMIN — INSULIN ASPART SCH: 100 INJECTION, SOLUTION INTRAVENOUS; SUBCUTANEOUS at 17:16

## 2020-05-29 RX ADMIN — METOPROLOL TARTRATE SCH MG: 25 TABLET, FILM COATED ORAL at 07:50

## 2020-05-29 RX ADMIN — MIDAZOLAM ONE MG: 1 INJECTION INTRAMUSCULAR; INTRAVENOUS at 11:22

## 2020-05-29 RX ADMIN — FUROSEMIDE SCH: 10 INJECTION, SOLUTION INTRAMUSCULAR; INTRAVENOUS at 12:44

## 2020-05-29 RX ADMIN — FUROSEMIDE SCH MG: 10 INJECTION, SOLUTION INTRAMUSCULAR; INTRAVENOUS at 13:14

## 2020-05-29 NOTE — CC
CARDIAC CATHETERIZATION REPORT



DATE OF SERVICE:

05/29/2020



PERFORMING PHYSICIAN:

Brian Staton M.D.



PROCEDURES PERFORMED:

1. Selective right and left coronary angiogram.

2. Left heart catheterization.

3. Successful stenting of the left circumflex coronary artery using a 2.0 x 15 mm [place]

    drug-eluting stent and a 3.0 x 15 mm Xience drug-eluting stent  with excellent

    angiographic results and reduction of stenosis from 100% to 0%.

4. Successful stenting of the mid left anterior descending artery using a 3.0 x 33 mm

    Xience drug-eluting stent with excellent angiographic results and reduction of

    stenosis from 80% to 0%.



INDICATION:

This is a 65-year-old female patient with obesity, diabetes, hypertension and

dyslipidemia who was admitted to the hospital with shortness of breath.  She was ruled

in for acute coronary event.  She was diagnosed with congestive heart failure secondary

to systolic dysfunction.  She underwent an echocardiogram which revealed severe

cardiomyopathy.  Because of that, heart catheterization was advised.



APPROACH:

Right radial artery.



COMPLICATIONS:

None.



LEVEL OF SEDATION:

Moderate, with sedation length of 62 minutes.



PROCEDURE DESCRIPTION:

After obtaining informed consent, the patient was brought to the cardiac cath lab.  The

right radial artery was cannulated using micropuncture technique. The micropuncture

wire passed easily. Then I placed a 6-[place] sheath in the right radial artery.



After that I gave the patient 2 mg of verapamil IA.



Selective right and left coronary angiogram was performed using JR4 and JL3.5

catheters.  Left heart catheterization was performed using the diagnostic JL3.5

catheter.



After that I did intervene on the LCX and LAD.  Please see separate paragraph for that.



SELECTIVE CORONARY ANGIOGRAM:

1. The right coronary artery is a large-caliber vessel.  It is a dominant vessel.  The

    RCA has mild disease only distally.  It bifurcates after that into PDA and PLV

    branches. Both appeared to be angiographically normal.

2. The LCX is a large-caliber vessel.  It is a nondominant vessel.  The proximal left

    circumflex is angiographically normal.  It gives rise to the first obtuse marginal

    branch which is a large-caliber vessel and seems to be angiographically normal.

    Then it gives rise to a second OM branch which appeared to be occluded in the

    proximal and mid portions.

3. The proximal LAD appeared to have mild disease only.  The mid LAD has a long

    tubular lesion that appeared to be in the range of 80%.  The LAD distally appeared

    to have mild disease only.

4. HEMODYNAMICS: The LVEDP was 18 mmHg without significant gradient across the aortic

    valve.



PERCUTANEOUS CORONARY INTERVENTION OF THE LEFT CIRCUMFLEX AND LEFT ANTERIOR DESCENDING

CORONARY ARTERY:

Anticoagulation was initiated using Angiomax.  Subsequently I tried to engage the left

main using JL3.5 guide, but it was large. So because of that I decided to go with JL3

guide.  With that I was able to engage the left main.  Subsequently I did cross the

lesion in the left circumflex coronary artery/OM2 using a Whisper wire.  After that,

balloon angioplasty was performed using a 2.0 x 12 mm balloon.  Subsequently I deployed

2 stents in OM2.  The first stent was a 2.0 x 15 mm Kt drug-eluting stent where the

stent was positioned under fluoroscopic guidance and deployed under its nominal

pressure.  The second stent was a 3.0 x 15 mm Xience ZEHRA where the stent was positioned

under fluoroscopic guidance as well with about 1 mm overlap between this stent and the

previous stent.  The second stent was deployed under 14 atmospheres for 20 seconds.

The area of overlap between the 2 stents was dilated using the stent balloon.  The

final angiogram showed good angiographic results.



Subsequently I tried to direct my Whisper wire into the LAD, but the wire would not go

to the LAD, and I was able to go to the LAD using a run-through wire.  The wire was

advanced all the way to the distal LAD.  After that I did direct stenting on the LAD

lesion using a 3.0 x 33 mm Xience ZEHRA where the stent was positioned again under

fluoroscopic guidance and deployed under 14 atmospheres for 20 seconds.  The following

angiogram showed good angiographic results and the procedure was completed without any

complication.



CONCLUSION:

1. Severe ischemic cardiomyopathy in this 65-year-old female patient who was admitted

    with shortness of breath and she was found to have mildly abnormal cardiac enzymes

    and severe LV dysfunction.

2. Severe 2-vessel coronary artery disease.  The patient was found to have occluded

    OM2 of the left circumflex and severe disease involving the LAD.

3. Successful stenting of OM2 of the left circumflex using 2 drug-eluting stents with

    excellent angiographic results.

4. Successful stenting of the mid LAD using one drug-eluting stent with excellent

    angiographic results.



POST-PROCEDURE MANAGEMENT:

1. Dual anti-platelet therapy.

2. Risk factor modifications.

3. Follow up with the patient.





MMODL / IJN: [id number] / Job#: [id number]

## 2020-05-29 NOTE — P.PN
Subjective


Progress Note Date: 05/29/20


Principal diagnosis: 





65-year-old female was admitted with shortness of breath and weight gain of 44 

pounds and  patient is being treated for heart failure exacerbation patient may 

have congestive heart failure left ventricular dysfunction and/or right-sided 

heart failure and pulmonary hypertension.  Echocardiogram is still pending.  

Patient's swelling in the both legs improved significantly.  Patient remains on 

Lasix patient feels much better today.  But still short of breath





Constitutional: Denied any fatigue denied any fever.


Cardio vascular: denied any chest pain, palpitations


Gastrointestinal denied any nausea vomiting


Pulmonary: As mentioned in HPI


Neurologic denied any new focal deficits





All inpatient medications were reviewed and appropriate changes in these me

dications as dictated in the interval history and assessment and plan.





05/28/2020


Patient is seen and evaluated in follow-up currently sitting up in the chair 

with bilateral lower extremities elevated that are quite edematous although 

patient states this is improved from yesterday.  Patient remains on Lasix and 

will continue at this time.  Cardiology and pulmonary following.  Patient states

her shortness of breath has resolved and patient is currently on room air.  She 

does not normally wear oxygen in the home setting.  Patient currently remains on

a heparin drip and will continue at this time.  Creatinine slightly improved at 

1.01, sodium is 140, covid testing was negative.  PT/OT worked with the patient 

requiring some minimal assistance today although continues to need some 

assistance with the use of a walker.  Patient is refusing rehab upon discharge. 

No reports of chest pain, shortness of breath, or palpitations.  Patient is 

afebrile.  No reports of nausea or vomiting and patient is tolerating diet.





05/29/2020


Patient is seen and evaluated in follow-up with no acute overnight issues.  

Patient awaiting to undergo cardiac catheterization with cardiology this 

afternoon.  She remains on IV heparin along with IV Lasix drip and will continue

at this time.  Patient is diuresing well an indwelling Farmer catheter is noted 

to have a large amount of output.  Patient continues on room air with no reports

of chest pain, shortness of breath, or palpitations.  Patient is afebrile.  No 

reports of nausea or vomiting and patient is tolerating diet.  Sodium today is 

139, potassium is 4.7, creatinine is slightly elevated at 1.10.  Will repeat 

a.m. labs.











Objective





- Vital Signs


Vital signs: 


                                   Vital Signs











Temp  98.1 F   05/29/20 07:55


 


Pulse  69   05/29/20 13:25


 


Resp  18   05/29/20 13:10


 


BP  139/61   05/29/20 13:25


 


Pulse Ox  98   05/29/20 13:25








                                 Intake & Output











 05/28/20 05/29/20 05/29/20





 18:59 06:59 18:59


 


Intake Total 942.167 279.96 1130.864


 


Output Total 4800 5500 2600


 


Balance -3857.833 -5220.04 -1469.136


 


Weight  157 kg 


 


Intake:   


 


  IV   1010


 


  Intake, IV Titration 222.167 279.96 120.864





  Amount   


 


    Furosemide 100 mg In  100 





    Sodium Chloride 0.9% 90   





    ml @ 10 MG/HR 10 mls/hr   





    IV .Q10H [facility] Rx#:   





    [id number]   


 


    Heparin Sod,Pork in 0.45% 222.167 179.96 120.864





    NaCl 25,000 unit In 0.45   





    % NaCl 1 250ml.bag @ 6.59   





    UNITS/KG/HR 9.984 mls/hr   





    IV .Q24H [facility] Rx#:   





    [id number]   


 


  Oral 720  


 


Output:   


 


  Urine 4800 5500 2600


 


Other:   


 


  Voiding Method Indwelling Catheter Indwelling Catheter Indwelling Catheter


 


  # Voids 0  


 


  # Bowel Movements 0  














- Exam





GENERAL: The patient is alert and oriented x3, not in any acute distress.  

Morbidly obese with BMI of 54.6


HEENT: Pupils are round and equally reacting to light. EOMI. No scleral icterus.

No conjunctival pallor. Normocephalic, atraumatic. No pharyngeal erythema. No 

thyromegaly. 


CARDIOVASCULAR: S1 and S2 present. No murmurs, rubs, or gallops. 


PULMONARY: Chest is clear to auscultation, no wheezing or crackles. 


ABDOMEN: Soft, nontender, nondistended, normoactive bowel sounds. No palpable 

organomegaly.  Farmer catheter noted


MUSCULOSKELETAL: No joint swelling or deformity.


EXTREMITIES: No cyanosis, clubbing, extensive bilateral pedal edema with chronic

venous stasis and venous stasis dermatosis of both legs with more healed ulcers,

swelling in both legs intermittently improved


NEUROLOGICAL: Gross neurological examination did not reveal any focal deficits. 


SKIN: No rashes. 








- Labs


CBC & Chem 7: 


                                 05/26/20 11:59





                                 05/29/20 06:27


Labs: 


                  Abnormal Lab Results - Last 24 Hours (Table)











  05/28/20 05/28/20 05/28/20 Range/Units





  15:35 16:55 19:54 


 


APTT  63.4 H    (22.0-30.0)  sec


 


Chloride     ()  mmol/L


 


Carbon Dioxide     (22-30)  mmol/L


 


BUN     (7-17)  mg/dL


 


Creatinine     (0.52-1.04)  mg/dL


 


Glucose     (74-99)  mg/dL


 


POC Glucose (mg/dL)   138 H  200 H  (75-99)  mg/dL














  05/29/20 05/29/20 05/29/20 Range/Units





  06:05 06:27 07:44 


 


APTT    44.8 H  (22.0-30.0)  sec


 


Chloride   95 L   ()  mmol/L


 


Carbon Dioxide   37 H   (22-30)  mmol/L


 


BUN   30 H   (7-17)  mg/dL


 


Creatinine   1.10 H   (0.52-1.04)  mg/dL


 


Glucose   68 L   (74-99)  mg/dL


 


POC Glucose (mg/dL)  66 L    (75-99)  mg/dL














  05/29/20 Range/Units





  12:50 


 


APTT   (22.0-30.0)  sec


 


Chloride   ()  mmol/L


 


Carbon Dioxide   (22-30)  mmol/L


 


BUN   (7-17)  mg/dL


 


Creatinine   (0.52-1.04)  mg/dL


 


Glucose   (74-99)  mg/dL


 


POC Glucose (mg/dL)  110 H  (75-99)  mg/dL














Assessment and Plan


Assessment: 





-Bilateral pedal edema shortness of breath patient most probably has severe p

ulmonary hypertension from undiagnosed sleep apnea including to extensive pedal 

edema fluid retention and right-sided heart failure I cannot completely rule out

left-sided heart failure.  Echo report shows moderate concentric left 

ventricular hypertrophy with overall left ventricular systolic function is 

severely impaired with an EF of 25-30% with trivial pericardial effusion present

along with borderline pulmonary artery hypertension, mild mitral and tricuspid 

regurgitation also present.  Patient will be initiated on a Lasix drip and 

continued heparin.  Patient awaiting to undergo cardiac catheterization this 

afternoon


-Acute exacerbation of congestive heart failure, acute on chronic systolic with 

an ejection fraction of 25-30%


-Hyponatremia hypervolemic hyponatremia improving with IV Lasix


-Chronic kidney disease stage III secondary to diabetic nephropathy


-Mildly elevated troponin secondary to shortness of breath


-Mildly elevated liver enzymes secondary to hepatic congestion


-Obesity with sleep apnea and possible based stay hypoventilation syndrome


-Type 2 diabetes mellitus: The patient will be resumed on home regimen along 

with sliding scale


-DVT prophylaxis with heparin drip





Plan:


Continue current medications, management, and symptomatic treatment.  Patient 

scheduled to go undergo cardiac catheterization with cardiology this afternoon 

will await report.  Patient is currently maintained on a Lasix drip and will 

continue at this time along with IV heparin.  Further recommendations to follow.

## 2020-05-30 LAB
ANION GAP SERPL CALC-SCNC: 5 MMOL/L
BUN SERPL-SCNC: 25 MG/DL (ref 7–17)
CALCIUM SPEC-MCNC: 9.1 MG/DL (ref 8.4–10.2)
CHLORIDE SERPL-SCNC: 94 MMOL/L (ref 98–107)
CO2 SERPL-SCNC: 39 MMOL/L (ref 22–30)
ERYTHROCYTE [DISTWIDTH] IN BLOOD BY AUTOMATED COUNT: 4.66 M/UL (ref 3.8–5.4)
ERYTHROCYTE [DISTWIDTH] IN BLOOD: 14.4 % (ref 11.5–15.5)
GLUCOSE BLD-MCNC: 120 MG/DL (ref 75–99)
GLUCOSE BLD-MCNC: 135 MG/DL (ref 75–99)
GLUCOSE BLD-MCNC: 184 MG/DL (ref 75–99)
GLUCOSE BLD-MCNC: 212 MG/DL (ref 75–99)
GLUCOSE BLD-MCNC: 259 MG/DL (ref 75–99)
GLUCOSE SERPL-MCNC: 102 MG/DL (ref 74–99)
HCT VFR BLD AUTO: 39.4 % (ref 34–46)
HGB BLD-MCNC: 12.4 GM/DL (ref 11.4–16)
MCH RBC QN AUTO: 26.7 PG (ref 25–35)
MCHC RBC AUTO-ENTMCNC: 31.6 G/DL (ref 31–37)
MCV RBC AUTO: 84.5 FL (ref 80–100)
PLATELET # BLD AUTO: 389 K/UL (ref 150–450)
POTASSIUM SERPL-SCNC: 3.9 MMOL/L (ref 3.5–5.1)
SODIUM SERPL-SCNC: 138 MMOL/L (ref 137–145)
WBC # BLD AUTO: 9.1 K/UL (ref 3.8–10.6)

## 2020-05-30 RX ADMIN — INSULIN ASPART SCH UNIT: 100 INJECTION, SOLUTION INTRAVENOUS; SUBCUTANEOUS at 12:42

## 2020-05-30 RX ADMIN — METOPROLOL TARTRATE SCH MG: 25 TABLET, FILM COATED ORAL at 09:23

## 2020-05-30 RX ADMIN — ASPIRIN 81 MG CHEWABLE TABLET SCH MG: 81 TABLET CHEWABLE at 09:23

## 2020-05-30 RX ADMIN — FUROSEMIDE SCH MG: 40 TABLET ORAL at 09:24

## 2020-05-30 RX ADMIN — IPRATROPIUM BROMIDE AND ALBUTEROL SULFATE SCH ML: .5; 3 SOLUTION RESPIRATORY (INHALATION) at 16:11

## 2020-05-30 RX ADMIN — INSULIN ASPART SCH UNIT: 100 INJECTION, SOLUTION INTRAVENOUS; SUBCUTANEOUS at 20:47

## 2020-05-30 RX ADMIN — METOPROLOL TARTRATE SCH MG: 25 TABLET, FILM COATED ORAL at 20:46

## 2020-05-30 RX ADMIN — IPRATROPIUM BROMIDE AND ALBUTEROL SULFATE SCH ML: .5; 3 SOLUTION RESPIRATORY (INHALATION) at 20:17

## 2020-05-30 RX ADMIN — INSULIN DETEMIR SCH UNIT: 100 INJECTION, SOLUTION SUBCUTANEOUS at 20:47

## 2020-05-30 RX ADMIN — INSULIN ASPART SCH UNIT: 100 INJECTION, SOLUTION INTRAVENOUS; SUBCUTANEOUS at 17:43

## 2020-05-30 RX ADMIN — LISINOPRIL SCH MG: 10 TABLET ORAL at 09:24

## 2020-05-30 RX ADMIN — IPRATROPIUM BROMIDE AND ALBUTEROL SULFATE SCH ML: .5; 3 SOLUTION RESPIRATORY (INHALATION) at 07:34

## 2020-05-30 RX ADMIN — INSULIN ASPART SCH: 100 INJECTION, SOLUTION INTRAVENOUS; SUBCUTANEOUS at 06:26

## 2020-05-30 RX ADMIN — OXYBUTYNIN CHLORIDE SCH MG: 10 TABLET, EXTENDED RELEASE ORAL at 09:23

## 2020-05-30 RX ADMIN — IPRATROPIUM BROMIDE AND ALBUTEROL SULFATE SCH ML: .5; 3 SOLUTION RESPIRATORY (INHALATION) at 11:29

## 2020-05-30 RX ADMIN — PRASUGREL SCH MG: 10 TABLET, FILM COATED ORAL at 16:36

## 2020-05-30 NOTE — PN
PROGRESS NOTE



PULMONARY/CRITICAL CARE PROGRESS NOTE:



DATE OF SERVICE:

05/30/2020



This is a 65-year-old obese white female.  We saw her initially in the emergency room.

She came in with acute hypoxemic respiratory failure secondary to acute exacerbation of

systolic congestive heart failure.  The patient's ejection fraction is 25-30%.  In

addition, we were concerned about a non ST-segment elevation myocardial infarction.

She did go to the catheterization laboratory.  She had a catheterization yesterday.

She did have successful stenting of the left circumflex coronary artery using two

stents, and also successful stenting of a mid left anterior descending coronary artery

using one stent.  Currently, the patient feels very good.  She states that she is not

having any chest pain or chest discomfort.  She is not short of breath.  She is hoping

to be discharged soon.  In addition, the patient has a history of diabetes mellitus,

previous MRSA infection and nonhealing wound in the left leg, and probable sleep apnea

syndrome.  She will need evaluation for that in the outpatient setting.



PHYSICAL EXAMINATION:

VITAL SIGNS: Current vital signs are reviewed. Temperature 98.1, heart rate 78,

respiratory rate 20, blood pressure 120/82, mean 94, room air saturation 97%.  Appears

in no acute distress.

HEENT: Examination is grossly unremarkable.  Mucous membranes are moist.

NECK:  Supple.  No supplemental oxygen.  No neck vein distention.  No adenopathy or

thyromegaly.

CARDIOVASCULAR: Examination reveals a regular rhythm rate.  S1, S2 normal.  Heart rate

78.  No murmur.

LUNGS:  Reveal mostly clear breath sounds.  A few scattered mild rhonchi.  No wheezes

or crackles.

ABDOMEN:  Obese. Bowel sounds are heard.

EXTREMITIES are intact.  No edema.

SKIN: Without rash.

NEUROLOGIC: Examination is brief but nonfocal.



LABS:

Reviewed.  White count 9.1, hemoglobin 12.4, hematocrit 39.4, platelet count 389,000.

Sodium 138, potassium 3.9, chloride 94, CO2 39, anion gap is 5. BUN and creatinine were

25 and 1.04.



Microbiologic studies are negative.



A chest x-ray was done today.  It shows evidence of cardiomegaly without any pulmonary

infiltrate.



Medications are reviewed.



ASSESSMENT:

1. Acute hypoxemic respiratory failure secondary to acute exacerbation of the

    patient's systolic congestive heart failure, with an ejection fraction of 25-30%.

2. Postoperative day #1 status post stenting of the circumflex coronary artery and LAD

    coronary artery.

3. Non ST-segment elevation myocardial infarction.

4. Significant weight gain, about 40 pounds, in the last month.

5. Morbid obesity.

6. Lifetime nonsmoker.

7. Diabetes mellitus with diabetic neuropathy.

8. History of MRSA infection and nonhealing wound in the left leg.

9. Probable sleep apnea syndrome, will need outpatient evaluation.



PLAN:

The patient had stents placed yesterday.  She is doing well.  She feels well.  She

denies any shortness of breath, difficulty breathing, coughing, wheezing, phlegm

production, chest pain, chest pressure, palpitations, or any other complaints for that

matter.  She is hoping to be discharged in the near future.  No additional

recommendations are made.  We will follow up with her in the outpatient setting.  She

will need to be evaluated for sleep apnea syndrome.





MMGEOVANNAL / RADHAN: [id number] / Job#: [id number]

## 2020-05-30 NOTE — XR
EXAMINATION TYPE: XR chest 1V

 

DATE OF EXAM: 5/30/2020

 

CLINICAL HISTORY: Difficulty breathing and CHF progress study.  

 

TECHNIQUE: Single AP portable frontal view of the chest is obtained.

 

COMPARISON: Chest x-ray from 2 days earlier

 

FINDINGS:  Slightly suboptimal due to portable technique and patient's large body habitus similar to 
prior. Cardiac silhouette size is stable and mildly enlarged. Chronic parenchymal changes without new
 suspicious focal airspace opacity, pleural effusion, or pneumothorax seen bilaterally. Osseous struc
tures grossly intact.

 

IMPRESSION: Suboptimal study, cardiomegaly without new or acute pulmonary process clearly seen.

## 2020-05-30 NOTE — P.PN
Subjective


Progress Note Date: 05/30/20





This is a very pleasant 65-year-old female patient with a past medical history 

significant for morbid obesity, diabetes, hypertension, and dyslipidemia as well

as tonic lower extremities edema.  She initially presented to the hospital with 

symptoms of progressively worsening shortness of breath and significant weight 

gain.  She was seen in consultation yesterday by Dr. Reddy, and initiated on IV 

Lasix.  He diuresed very well through the night last night, 4000 output.  Blood 

pressure 124/70 with a heart rate in the 70s to 80s, 91% on room air.  Sodium 

140, potassium 4.2, BUN 32, creatinine 1.0.  Patient continues to have a 

significant amount of bilateral peripheral edema.  We will discontinue the IV 

push Lasix and start her on a Lasix drip today.  Continue to monitor closely her

intake and output along with daily weights and daily lytes BUN and creatinine.  

She also had an echo to Doppler performed which revealed an ejection fraction of

25-30%.  Dr. Reddy had a lengthy discussion with the patient regarding cardiac 

catheterization as well as the risks and the benefits.  We will tentatively s

chedule this for tomorrow.








05/30/2020


Patient was taken to the cardiac catheterization lab yesterday where she 

underwent angioplasty and stenting of the LAD and circumflex.  She was seen and 

examined this morning sitting up in the chair at bedside, feels well, denies any

chest pain and her breathing is stable, she diuresed again well through the 

night last night.  We will discontinue her IV Lasix, put her on oral diuretics. 

Remove the Farmer catheter.  Her lytes BUN and creatinine from this morning 

remain pending, if they are stable she should be able to be discharged home from

our perspective.





Objective





- Vital Signs


Vital signs: 


                                   Vital Signs











Temp  98.2 F   05/30/20 03:15


 


Pulse  80   05/30/20 07:46


 


Resp  18   05/30/20 03:15


 


BP  160/72   05/30/20 03:15


 


Pulse Ox  95   05/30/20 03:15








                                 Intake & Output











 05/29/20 05/30/20 05/30/20





 18:59 06:59 18:59


 


Intake Total 1366.864  


 


Output Total 3200 3575 


 


Balance -1833.136 -3575 


 


Weight  149.3 kg 


 


Intake:   


 


  IV 1010  


 


  Intake, IV Titration 120.864  





  Amount   


 


    Heparin Sod,Pork in 0.45% 120.864  





    NaCl 25,000 unit In 0.45   





    % NaCl 1 250ml.bag @ 6.59   





    UNITS/KG/HR 9.984 mls/hr   





    IV .Q24H Pending sale to [facility] Rx#:   





    [id number]   


 


  Oral 236  


 


Output:   


 


  Urine 3200 3575 


 


Other:   


 


  Voiding Method Indwelling Catheter Indwelling Catheter 














- Exam





GENERAL EXAM: Alert, a pleasant, 65-year-old obese white female, no acute 

distress at the time of my examination


HEAD: Normocephalic/atraumatic.


EYES: Normal reaction of pupils, equal size.  Conjunctiva pink, sclera white.


NOSE: Clear with pink turbinates.


THROAT: No erythema or exudates.


NECK: No masses, no JVD, no thyroid enlargement, no adenopathy.


CHEST: No chest wall deformity.  Symmetrical expansion. 


LUNGS: Equal air entry with no crackles, wheeze, rhonchi or dullness.  


CVS: Regular rate and rhythm, normal S1 and S2, no gallops, no murmurs, no rubs


ABDOMEN: Soft, obese, nontender.  No hepatosplenomegaly, normal bowel sounds, no

guarding or rigidity.


EXTREMITIES: No clubbing, chronic venous stasis changes present to lower 

extremities, suspect some chronic swelling to lower extremities no cyanosis, 2+ 

pulses and upper and lower extremities. 1+ lower extremity edema.  Right radial 

site clean and dry, good distal pulse.


MUSCULOSKELETAL: Muscle strength and tone normal.


SPINE: No scoliosis or deformity


SKIN: No rashes


CENTRAL NERVOUS SYSTEM: Alert and oriented -3.  No focal deficits, tone is 

normal in all 4 extremities.


PSYCHIATRIC: Alert and oriented -3.  Appropriate affect.  Intact judgment and 

insight.











- Labs


CBC & Chem 7: 


                                 05/30/20 07:12





                                 05/30/20 07:12


Labs: 


                  Abnormal Lab Results - Last 24 Hours (Table)











  05/29/20 05/29/20 05/29/20 Range/Units





  12:50 17:03 20:09 


 


Chloride     ()  mmol/L


 


Carbon Dioxide     (22-30)  mmol/L


 


BUN     (7-17)  mg/dL


 


Glucose     (74-99)  mg/dL


 


POC Glucose (mg/dL)  110 H  103 H  154 H  (75-99)  mg/dL














  05/29/20 05/30/20 05/30/20 Range/Units





  23:57 06:22 07:12 


 


Chloride    94 L  ()  mmol/L


 


Carbon Dioxide    39 H  (22-30)  mmol/L


 


BUN    25 H  (7-17)  mg/dL


 


Glucose    102 H  (74-99)  mg/dL


 


POC Glucose (mg/dL)  135 H  120 H   (75-99)  mg/dL














Assessment and Plan


Plan: 





 Assessment and plan:





#1. Acute hypoxemic respiratory failure related to acute exacerbation of 

congestive heart failure systolic acute on chronic, ejection fraction 25-30%





#2.  Non-ST elevated myocardial infarction, status post angioplasty and stenting

of the LAD and circumflex





#3.  Significant weight gain of around 40 pounds in the last month





#4. Morbid obesity





#5. Lifetime nonsmoker





#6. diabetes mellitus type 2, with diabetic neuropathy





#7.  History of MRSA infection and nonhealing wound in the left leg








Plan


we will discontinue the IV Lasix and start the patient on oral diuretics.  

Discontinue Farmer catheter and increase activity.  Plan for possible discharge 

hometoday.  Follow-up appointment in the office with Dr. Reddy post discharge.


DNP note has been reviewed, I agree with a documented findings and plan of care.

 Patient was seen and examined.

## 2020-05-30 NOTE — P.PN
Subjective





65-year-old female was admitted with shortness of breath and weight gain of 44 

pounds and  patient is being treated for heart failure exacerbation patient may 

have congestive heart failure left ventricular dysfunction and/or right-sided 

heart failure and pulmonary hypertension.  Echocardiogram is still pending.  

Patient's swelling in the both legs improved significantly.  Patient remains on 

Lasix patient feels much better today.  But still short of breath








All inpatient medications were reviewed and appropriate changes in these 

medications as dictated in the interval history and assessment and plan.





05/28/2020


Patient is seen and evaluated in follow-up currently sitting up in the chair 

with bilateral lower extremities elevated that are quite edematous although 

patient states this is improved from yesterday.  Patient remains on Lasix and 

will continue at this time.  Cardiology and pulmonary following.  Patient states

her shortness of breath has resolved and patient is currently on room air.  She 

does not normally wear oxygen in the home setting.  Patient currently remains on

a heparin drip and will continue at this time.  Creatinine slightly improved at 

1.01, sodium is 140, covid testing was negative.  PT/OT worked with the patient 

requiring some minimal assistance today although continues to need some 

assistance with the use of a walker.  Patient is refusing rehab upon discharge. 

No reports of chest pain, shortness of breath, or palpitations.  Patient is 

afebrile.  No reports of nausea or vomiting and patient is tolerating diet.





05/29/2020


Patient is seen and evaluated in follow-up with no acute overnight issues.  Lance pierce awaiting to undergo cardiac catheterization with cardiology this 

afternoon.  She remains on IV heparin along with IV Lasix drip and will continue

at this time.  Patient is diuresing well an indwelling Farmer catheter is noted 

to have a large amount of output.  Patient continues on room air with no reports

of chest pain, shortness of breath, or palpitations.  Patient is afebrile.  No 

reports of nausea or vomiting and patient is tolerating diet.  Sodium today is 

139, potassium is 4.7, creatinine is slightly elevated at 1.10.  Will repeat 

a.m. labs.





05/30/2020


Patient is doing much better today patient on oral Lasix patient is still 

requiring oxygen I'll obtain a repeat chest x-ray today patient had a ER for 

from 25-30% patient had a cardiac catheterization and stenting to circumflex





Objective





- Vital Signs


Vital signs: 


                                   Vital Signs











Temp  98.1 F   05/30/20 08:00


 


Pulse  88   05/30/20 11:41


 


Resp  18   05/30/20 08:00


 


BP  118/59   05/30/20 08:00


 


Pulse Ox  87 L  05/30/20 08:00








                                 Intake & Output











 05/29/20 05/30/20 05/30/20





 18:59 06:59 18:59


 


Intake Total 1366.864  


 


Output Total 3200 3575 


 


Balance -1833.136 -3575 


 


Weight  149.3 kg 


 


Intake:   


 


  IV 1010  


 


  Intake, IV Titration 120.864  





  Amount   


 


    Heparin Sod,Pork in 0.45% 120.864  





    NaCl 25,000 unit In 0.45   





    % NaCl 1 250ml.bag @ 6.59   





    UNITS/KG/HR 9.984 mls/hr   





    IV .Q24H [facility]NE Rx#:   





    [id number]   


 


  Oral 236  


 


Output:   


 


  Urine 3200 3575 


 


Other:   


 


  Voiding Method Indwelling Catheter Indwelling Catheter Indwelling Catheter














- Exam








PHYSICAL EXAMINATION: 





GENERAL: The patient is alert and oriented x3, not in any acute distress.  Her 

but obese with BMI of around 54


HEENT: Pupils are round and equally reacting to light. EOMI. No scleral icterus.

No conjunctival pallor. Normocephalic, atraumatic. No pharyngeal erythema. No 

thyromegaly. 


CARDIOVASCULAR: S1 and S2 present. No murmurs, rubs, or gallops. 


PULMONARY: Chest is clear to auscultation, no wheezing or crackles. 


ABDOMEN: Soft, nontender, nondistended, normoactive bowel sounds. No palpable 

organomegaly. 


MUSCULOSKELETAL: No joint swelling or deformity.


EXTREMITIES: No cyanosis, clubbing, extensive bilateral pedal edema with chronic

venous stasis and venous stasis dermatosis of both legs with more healed ulcers,

swelling in both legs intermittently improved


NEUROLOGICAL: Gross neurological examination did not reveal any focal deficits. 


SKIN: No rashes. 























- Labs


CBC & Chem 7: 


                                 05/30/20 07:12





                                 05/30/20 07:12


Labs: 


                  Abnormal Lab Results - Last 24 Hours (Table)











  05/29/20 05/29/20 05/29/20 Range/Units





  12:50 17:03 20:09 


 


Chloride     ()  mmol/L


 


Carbon Dioxide     (22-30)  mmol/L


 


BUN     (7-17)  mg/dL


 


Glucose     (74-99)  mg/dL


 


POC Glucose (mg/dL)  110 H  103 H  154 H  (75-99)  mg/dL














  05/29/20 05/30/20 05/30/20 Range/Units





  23:57 06:22 07:12 


 


Chloride    94 L  ()  mmol/L


 


Carbon Dioxide    39 H  (22-30)  mmol/L


 


BUN    25 H  (7-17)  mg/dL


 


Glucose    102 H  (74-99)  mg/dL


 


POC Glucose (mg/dL)  135 H  120 H   (75-99)  mg/dL














  05/30/20 Range/Units





  11:43 


 


Chloride   ()  mmol/L


 


Carbon Dioxide   (22-30)  mmol/L


 


BUN   (7-17)  mg/dL


 


Glucose   (74-99)  mg/dL


 


POC Glucose (mg/dL)  259 H  (75-99)  mg/dL














Assessment and Plan


Plan: 


Acute non-ST elevation microinfarction: She is status post cardiac catheteriza

tion and stent to circumflex.


-Acute systolic dysfunction with pulmonary edema and acute exacerbation of CHF 

patient was on Lasix to which was switched to oral Lasix patient had EF of 25-

30%


-Hyponatremia hypervolemic hyponatremia improved with Lasix


-Chronic kidney disease stage III secondary to diabetic nephropathy


-Mildly elevated troponin secondary to shortness of breath


-Mildly elevated liver enzymes secondary to hepatic congestion


-Obesity with sleep apnea and possible based stay hypoventilation syndrome


-Type 2 diabetes mellitus: The patient will be resumed on home regimen along 

with sliding scale


-DVT prophylaxis with Lovenox

## 2020-05-31 VITALS
HEART RATE: 82 BPM | TEMPERATURE: 98.5 F | SYSTOLIC BLOOD PRESSURE: 124 MMHG | DIASTOLIC BLOOD PRESSURE: 62 MMHG | RESPIRATION RATE: 20 BRPM

## 2020-05-31 LAB
GLUCOSE BLD-MCNC: 119 MG/DL (ref 75–99)
GLUCOSE BLD-MCNC: 154 MG/DL (ref 75–99)

## 2020-05-31 RX ADMIN — METOPROLOL TARTRATE SCH MG: 25 TABLET, FILM COATED ORAL at 08:57

## 2020-05-31 RX ADMIN — PRASUGREL SCH MG: 10 TABLET, FILM COATED ORAL at 08:57

## 2020-05-31 RX ADMIN — FUROSEMIDE SCH MG: 40 TABLET ORAL at 08:57

## 2020-05-31 RX ADMIN — IPRATROPIUM BROMIDE AND ALBUTEROL SULFATE SCH: .5; 3 SOLUTION RESPIRATORY (INHALATION) at 11:21

## 2020-05-31 RX ADMIN — OXYBUTYNIN CHLORIDE SCH MG: 10 TABLET, EXTENDED RELEASE ORAL at 08:57

## 2020-05-31 RX ADMIN — ASPIRIN 81 MG CHEWABLE TABLET SCH MG: 81 TABLET CHEWABLE at 08:57

## 2020-05-31 RX ADMIN — INSULIN ASPART SCH: 100 INJECTION, SOLUTION INTRAVENOUS; SUBCUTANEOUS at 06:13

## 2020-05-31 RX ADMIN — LISINOPRIL SCH MG: 10 TABLET ORAL at 08:57

## 2020-05-31 RX ADMIN — IPRATROPIUM BROMIDE AND ALBUTEROL SULFATE SCH ML: .5; 3 SOLUTION RESPIRATORY (INHALATION) at 08:10

## 2020-05-31 NOTE — P.DS
Providers


Date of admission: 


05/26/20 04:58





Attending physician: 


Alejandra Mead





Consults: 





                                        





05/26/20 04:58


Consult Physician Routine 


   Consulting Provider: Amanda Calhoun


   Consult Reason/Comments: hypoxia


   Do you want consulting provider notified?: Yes


Consult Physician Routine 


   Consulting Provider: Thelma Ruff


   Consult Reason/Comments: chf


   Do you want consulting provider notified?: Yes





05/29/20 12:20


Consult Physician Routine 


   Consulting Provider: [facility]


   Consult Reason/Comments: Post Interventional patient


   Do you want consulting provider notified?: Already Contacted











Primary care physician: 


Trevor TERESA [facility] Course: 





65-year-old female was admitted with shortness of breath and weight gain of 44 

pounds and  patient is being treated for heart failure exacerbation patient may 

have congestive heart failure left ventricular dysfunction and/or right-sided 

heart failure and pulmonary hypertension.  Echocardiogram is still pending.  

Patient's swelling in the both legs improved significantly.  Patient remains on 

Lasix patient feels much better today.  But still short of breath








All inpatient medications were reviewed and appropriate changes in these 

medications as dictated in the interval history and assessment and plan.





05/28/2020


Patient is seen and evaluated in follow-up currently sitting up in the chair 

with bilateral lower extremities elevated that are quite edematous although 

patient states this is improved from yesterday.  Patient remains on Lasix and 

will continue at this time.  Cardiology and pulmonary following.  Patient states

her shortness of breath has resolved and patient is currently on room air.  She 

does not normally wear oxygen in the home setting.  Patient currently remains on

a heparin drip and will continue at this time.  Creatinine slightly improved at 

1.01, sodium is 140, covid testing was negative.  PT/OT worked with the patient 

requiring some minimal assistance today although continues to need some 

assistance with the use of a walker.  Patient is refusing rehab upon discharge. 

No reports of chest pain, shortness of breath, or palpitations.  Patient is 

afebrile.  No reports of nausea or vomiting and patient is tolerating diet.





05/29/2020


Patient is seen and evaluated in follow-up with no acute overnight issues.  

Patient awaiting to undergo cardiac catheterization with cardiology this 

afternoon.  She remains on IV heparin along with IV Lasix drip and will continue

at this time.  Patient is diuresing well an indwelling Farmer catheter is noted 

to have a large amount of output.  Patient continues on room air with no reports

of chest pain, shortness of breath, or palpitations.  Patient is afebrile.  No 

reports of nausea or vomiting and patient is tolerating diet.  Sodium today is 

139, potassium is 4.7, creatinine is slightly elevated at 1.10.  Will repeat 

a.m. labs.





05/30/2020


Patient is doing much better today patient on oral Lasix patient is still 

requiring oxygen I'll obtain a repeat chest x-ray today patient had a ER for 

from 25-30% patient had a cardiac catheterization and stenting to circumflex.





05/31/2020


she and is clinically doing well cleared for discharge patient will be 

discharged today, Patient is not requiring any oxygen











PHYSICAL EXAMINATION: 





GENERAL: The patient is alert and oriented x3, not in any acute distress. Well 

developed, well nourished. 


HEENT: Pupils are round and equally reacting to light. EOMI. No scleral icterus.

No conjunctival pallor. Normocephalic, atraumatic. No pharyngeal erythema. No 

thyromegaly. 


CARDIOVASCULAR: S1 and S2 present. No murmurs, rubs, or gallops. 


PULMONARY: Chest is clear to auscultation, no wheezing or crackles. 


ABDOMEN: Soft, nontender, nondistended, normoactive bowel sounds. No palpable 

organomegaly. 


MUSCULOSKELETAL: No joint swelling or deformity.


EXTREMITIES: No cyanosis, clubbing, patient does have some pedal edema


NEUROLOGICAL: Gross neurological examination did not reveal any focal deficits. 


SKIN: No rashes. 








Assessment and Plan


Plan: 


Acute non-ST elevation microinfarction: She is status post cardiac 

catheterization and stent to circumflex.


-Acute systolic dysfunction with pulmonary edema and acute exacerbation of CHF 

patient was on Lasix to which was switched to oral Lasix patient had EF of 25-

30%


-Hyponatremia hypervolemic hyponatremia improved with Lasix


-Chronic kidney disease stage III secondary to diabetic nephropathy


-Mildly elevated troponin secondary to shortness of breath


-Mildly elevated liver enzymes secondary to hepatic congestion


-Obesity with sleep apnea 


-Type 2 diabetes mellitus: The patient will be resumed on home regimen along 

with sliding scale





Patient Condition at Discharge: Fair





Plan - Discharge Summary


Discharge Rx Participant: No


New Discharge Prescriptions: 


New


   Aspirin 81 mg PO DAILY #30 chew


   Prasugrel [Effient] 10 mg PO DAILY #30 tab


   Furosemide [Lasix] 40 mg PO DAILY #30 tab


   Metoprolol Tartrate [Lopressor] 12.5 mg PO BID #60 tab


   Nitroglycerin Sl Tabs [Nitrostat] 0.4 mg SUBLINGUAL Q5M PRN #25 tab


     PRN Reason: Chest Pain





Continue


   Multivitamin [Multivitamins] 1 tab PO DAILY


   Ascorbic Acid [Vitamin C] 500 mg PO BID


   Lisinopril [Zestril] 10 mg PO DAILY


   Tolterodine Tartrate [Detrol LA] 4 mg PO DAILY


   Liraglutide [Victoza 3-Fredrick] 1.8 mg SQ DAILY


   Insulin Degludec [Tresiba Flextouch U-200] 70 units SQ HS


   metFORMIN HCL [Glucophage] 500 mg PO TID #0


Discharge Medication List





Multivitamin [Multivitamins] 1 tab PO DAILY 05/01/14 [History]


Ascorbic Acid [Vitamin C] 500 mg PO BID 05/08/14 [History]


Insulin Degludec [Tresiba Flextouch U-200] 70 units SQ HS 05/26/20 [History]


Liraglutide [Victoza 3-Fredrick] 1.8 mg SQ DAILY 05/26/20 [History]


Lisinopril [Zestril] 10 mg PO DAILY 05/26/20 [History]


Tolterodine Tartrate [Detrol LA] 4 mg PO DAILY 05/26/20 [History]


Aspirin 81 mg PO DAILY #30 chew 05/30/20 [Rx]


Furosemide [Lasix] 40 mg PO DAILY #30 tab 05/30/20 [Rx]


Metoprolol Tartrate [Lopressor] 12.5 mg PO BID #60 tab 05/30/20 [Rx]


Nitroglycerin Sl Tabs [Nitrostat] 0.4 mg SUBLINGUAL Q5M PRN #25 tab 05/30/20 

[Rx]


Prasugrel [Effient] 10 mg PO DAILY #30 tab 05/30/20 [Rx]


metFORMIN HCL [Glucophage] 500 mg PO TID #0 05/30/20 [Rx]








Follow up Appointment(s)/Referral(s): 


[facility] [NON-STAFF] - 1-2 Days (Home care will call you to set up 

appointment)


Trevor Bear DO [Primary Care Provider] - 3 Days (Please call Monday to set 

up follow up appointment)


Brian Staton MD [STAFF PHYSICIAN] - 1 Week (Please call Monday and set up follow 

up appointment)


Remi Bruno DO [Doctor of Osteopathic Medicine] - 2 Weeks


(please call on Monday to make follow up appointment


)


Patient Instructions/Handouts:  Heart Failure (DC), COPD (Chronic Obstructive 

Pulmonary Disease) (DC)


Discharge Disposition: HOME SELF-CARE

## 2020-05-31 NOTE — PN
PROGRESS NOTE



PULMONARY/CRITICAL CARE PROGRESS NOTE:



DATE OF SERVICE:

05/31/2020



This is a 65-year-old female that we saw today in followup.  She had acute hypoxemic

respiratory failure secondary to exacerbation of the patient's systolic congestive

heart failure.  She has an ejection fraction of 25-30%, She is postop day #2, status

post stenting of the circumflex coronary artery and LAD coronary artery.  She had a non

ST-segment elevation myocardial infarction.  She is feeling much better.  She is a

lifetime nonsmoker.  No known history of any chronic lung disease.



PHYSICAL EXAMINATION:

VITAL SIGNS: Current vital signs are reviewed. Temperature 98.5, heart rate 76,

respiratory rate 20, blood pressure 124/62, mean 82.  Saturation on room air is 94%.

Appears in no acute distress.

HEENT: Examination is grossly unremarkable.  Mucous membranes are moist.

NECK:  Supple.  Full range of motion.  No adenopathy.  Neck veins are flat.

CARDIOVASCULAR: Examination reveals regular rhythm rand ate.  Heart rate 76 beats per

minute.  S1, S2 normal.

LUNGS:  Reveal mostly clear breath sounds.  A few scattered rhonchi are noted.  A few

scattered crackles are noted.

ABDOMEN:  Soft but obese.

EXTREMITIES are intact.  There is slight edema.

SKIN: Without rash.

NEUROLOGIC: Examination is brief but nonfocal.



LABS:

Reviewed.  Nothing new to report.



Microbiology is negative.



No new x-rays to report.  A chest x-ray from yesterday shows cardiomegaly but no acute

abnormality otherwise.



Medications are reviewed.



ASSESSMENT:

1. Acute hypoxemic respiratory failure secondary to acute exacerbation of the

    patient's systolic congestive heart failure, with an ejection fraction of 25-30%.

2. Postoperative day #2 status post stenting of the circumflex and LAD coronary

    arteries.

3. Non ST-segment elevation myocardial infarction.

4. Significant weight gain, about 40 pounds, in the last month.

5. Morbid obesity.

6. Lifetime nonsmoker.

7. Diabetes mellitus with diabetic neuropathy.

8. History of MRSA infection and nonhealing wound in the left leg.

9. Probable sleep apnea syndrome.  The patient will need outpatient evaluation of

    that.



PLAN:

The patient will follow up in the office.  We will get her set up for sleep study.  She

is doing well.  She has no chest pain or chest discomfort.  She has no breathing

difficulty or shortness of breath.  She denies any cough, wheezing, phlegm production.

She is not coughing up any blood.  She will follow up with us in the office.





MMODL / IJN: [id number] / Job#: [id number]

## 2020-05-31 NOTE — P.PN
Subjective


Progress Note Date: 05/31/20





This is a very pleasant 65-year-old female patient with a past medical history 

significant for morbid obesity, diabetes, hypertension, and dyslipidemia as well

as tonic lower extremities edema.  She initially presented to the hospital with 

symptoms of progressively worsening shortness of breath and significant weight 

gain.  She was seen in consultation yesterday by Dr. Reddy, and initiated on IV 

Lasix.  He diuresed very well through the night last night, 4000 output.  Blood 

pressure 124/70 with a heart rate in the 70s to 80s, 91% on room air.  Sodium 

140, potassium 4.2, BUN 32, creatinine 1.0.  Patient continues to have a 

significant amount of bilateral peripheral edema.  We will discontinue the IV 

push Lasix and start her on a Lasix drip today.  Continue to monitor closely her

intake and output along with daily weights and daily lytes BUN and creatinine.  

She also had an echo to Doppler performed which revealed an ejection fraction of

25-30%.  Dr. Reddy had a lengthy discussion with the patient regarding cardiac 

catheterization as well as the risks and the benefits.  We will tentatively s

chedule this for tomorrow.








05/30/2020


Patient was taken to the cardiac catheterization lab yesterday where she 

underwent angioplasty and stenting of the LAD and circumflex.  She was seen and 

examined this morning sitting up in the chair at bedside, feels well, denies any

chest pain and her breathing is stable, she diuresed again well through the 

night last night.  We will discontinue her IV Lasix, put her on oral diuretics. 

Remove the Farmer catheter.  Her lytes BUN and creatinine from this morning 

remain pending, if they are stable she should be able to be discharged home from

our perspective.





05/31/2020


Patient was seen and examined this morning, overall doing well.  Denies any 

chest discomfort in her breathing is stable.  Blood pressure 124/60 with a heart

rate in the 70s to 80s, 90% on room air.  No lab data today





Objective





- Vital Signs


Vital signs: 


                                   Vital Signs











Temp  98.5 F   05/31/20 08:00


 


Pulse  78   05/31/20 08:21


 


Resp  20   05/31/20 08:00


 


BP  124/62   05/31/20 08:00


 


Pulse Ox  90 L  05/31/20 08:00








                                 Intake & Output











 05/30/20 05/31/20 05/31/20





 18:59 06:59 18:59


 


Intake Total   230


 


Output Total 2800 1100 


 


Balance -2800 -1100 230


 


Weight  150 kg 


 


Intake:   


 


  Oral   230


 


Output:   


 


  Urine 2800 1100 


 


Other:   


 


  Voiding Method Indwelling Catheter Toilet Toilet


 


  # Voids 0 1 


 


  # Bowel Movements 0  














- Exam





GENERAL EXAM: Alert, a pleasant, 65-year-old obese white female, no acute 

distress at the time of my examination


HEAD: Normocephalic/atraumatic.


EYES: Normal reaction of pupils, equal size.  Conjunctiva pink, sclera white.


NOSE: Clear with pink turbinates.


THROAT: No erythema or exudates.


NECK: No masses, no JVD, no thyroid enlargement, no adenopathy.


CHEST: No chest wall deformity.  Symmetrical expansion. 


LUNGS: Equal air entry with no crackles, wheeze, rhonchi or dullness.  


CVS: Regular rate and rhythm, normal S1 and S2, no gallops, no murmurs, no rubs


ABDOMEN: Soft, obese, nontender.  No hepatosplenomegaly, normal bowel sounds, no

guarding or rigidity.


EXTREMITIES: No clubbing, chronic venous stasis changes present to lower 

extremities, suspect some chronic swelling to lower extremities no cyanosis, 2+ 

pulses and upper and lower extremities. 1+ lower extremity edema.  Right radial 

site clean and dry, good distal pulse.


MUSCULOSKELETAL: Muscle strength and tone normal.


SPINE: No scoliosis or deformity


SKIN: No rashes


CENTRAL NERVOUS SYSTEM: Alert and oriented -3.  No focal deficits, tone is 

normal in all 4 extremities.


PSYCHIATRIC: Alert and oriented -3.  Appropriate affect.  Intact judgment and 

insight.











- Labs


CBC & Chem 7: 


                                 05/30/20 07:12





                                 05/30/20 07:12


Labs: 


                  Abnormal Lab Results - Last 24 Hours (Table)











  05/30/20 05/30/20 05/30/20 Range/Units





  11:43 17:26 20:24 


 


POC Glucose (mg/dL)  259 H  184 H  212 H  (75-99)  mg/dL














  05/31/20 05/31/20 Range/Units





  06:10 11:06 


 


POC Glucose (mg/dL)  119 H  154 H  (75-99)  mg/dL














Assessment and Plan


Plan: 





 Assessment and plan:





#1. Acute hypoxemic respiratory failure related to acute exacerbation of 

congestive heart failure systolic acute on chronic, ejection fraction 25-30%





#2.  Non-ST elevated myocardial infarction, status post angioplasty and stenting

of the LAD and circumflex





#3.  Significant weight gain of around 40 pounds in the last month





#4. Morbid obesity





#5. Lifetime nonsmoker





#6. diabetes mellitus type 2, with diabetic neuropathy





#7.  History of MRSA infection and nonhealing wound in the left leg








Plan


From cardiology's perspective, patient may be able to be discharged once cleared

by primary.  We'll make her a follow-up appointment in the office  post 

discharge.





DNP note has been reviewed, I agree with a documented findings and plan of care.

 Patient was seen and examined.

## 2020-07-08 ENCOUNTER — HOSPITAL ENCOUNTER (INPATIENT)
Dept: HOSPITAL 47 - EC | Age: 65
LOS: 5 days | Discharge: HOME HEALTH | DRG: 872 | End: 2020-07-13
Attending: INTERNAL MEDICINE | Admitting: INTERNAL MEDICINE
Payer: MEDICARE

## 2020-07-08 VITALS — BODY MASS INDEX: 48.4 KG/M2

## 2020-07-08 DIAGNOSIS — E11.621: ICD-10-CM

## 2020-07-08 DIAGNOSIS — E11.40: ICD-10-CM

## 2020-07-08 DIAGNOSIS — A40.9: Primary | ICD-10-CM

## 2020-07-08 DIAGNOSIS — N17.9: ICD-10-CM

## 2020-07-08 DIAGNOSIS — I11.0: ICD-10-CM

## 2020-07-08 DIAGNOSIS — I83.90: ICD-10-CM

## 2020-07-08 DIAGNOSIS — E11.65: ICD-10-CM

## 2020-07-08 DIAGNOSIS — E87.5: ICD-10-CM

## 2020-07-08 DIAGNOSIS — I50.42: ICD-10-CM

## 2020-07-08 DIAGNOSIS — Z90.710: ICD-10-CM

## 2020-07-08 DIAGNOSIS — Z83.3: ICD-10-CM

## 2020-07-08 DIAGNOSIS — Z95.5: ICD-10-CM

## 2020-07-08 DIAGNOSIS — I25.10: ICD-10-CM

## 2020-07-08 DIAGNOSIS — E66.01: ICD-10-CM

## 2020-07-08 DIAGNOSIS — L89.622: ICD-10-CM

## 2020-07-08 DIAGNOSIS — Z79.02: ICD-10-CM

## 2020-07-08 DIAGNOSIS — Z79.82: ICD-10-CM

## 2020-07-08 DIAGNOSIS — I87.2: ICD-10-CM

## 2020-07-08 DIAGNOSIS — L03.116: ICD-10-CM

## 2020-07-08 DIAGNOSIS — E86.0: ICD-10-CM

## 2020-07-08 DIAGNOSIS — Z86.14: ICD-10-CM

## 2020-07-08 DIAGNOSIS — Z79.899: ICD-10-CM

## 2020-07-08 DIAGNOSIS — Z82.49: ICD-10-CM

## 2020-07-08 DIAGNOSIS — Z79.84: ICD-10-CM

## 2020-07-08 DIAGNOSIS — Z88.2: ICD-10-CM

## 2020-07-08 DIAGNOSIS — E11.622: ICD-10-CM

## 2020-07-08 DIAGNOSIS — Z11.59: ICD-10-CM

## 2020-07-08 DIAGNOSIS — L03.115: ICD-10-CM

## 2020-07-08 DIAGNOSIS — Z60.2: ICD-10-CM

## 2020-07-08 PROCEDURE — 87040 BLOOD CULTURE FOR BACTERIA: CPT

## 2020-07-08 PROCEDURE — 80053 COMPREHEN METABOLIC PANEL: CPT

## 2020-07-08 PROCEDURE — 93005 ELECTROCARDIOGRAM TRACING: CPT

## 2020-07-08 PROCEDURE — 83605 ASSAY OF LACTIC ACID: CPT

## 2020-07-08 PROCEDURE — 99285 EMERGENCY DEPT VISIT HI MDM: CPT

## 2020-07-08 PROCEDURE — 83036 HEMOGLOBIN GLYCOSYLATED A1C: CPT

## 2020-07-08 PROCEDURE — 84484 ASSAY OF TROPONIN QUANT: CPT

## 2020-07-08 PROCEDURE — 80048 BASIC METABOLIC PNL TOTAL CA: CPT

## 2020-07-08 PROCEDURE — 85025 COMPLETE CBC W/AUTO DIFF WBC: CPT

## 2020-07-08 PROCEDURE — 86140 C-REACTIVE PROTEIN: CPT

## 2020-07-08 PROCEDURE — 85652 RBC SED RATE AUTOMATED: CPT

## 2020-07-08 SDOH — SOCIAL STABILITY - SOCIAL INSECURITY: PROBLEMS RELATED TO LIVING ALONE: Z60.2

## 2020-07-08 NOTE — ED
General Adult HPI





- General


Chief complaint: Extremity Problem,Nontraumatic


Stated complaint: Sepsis


Time Seen by Provider: 07/08/20 15:15


Source: patient, old records reviewed


Mode of arrival: EMS


Limitations: no limitations





- History of Present Illness


Initial comments: 





The patient is a 65-year-old female with past medical history of diabetes, 

morbid obesity and lymphedema who presents emergency Department with reported 

weakness, nausea and vomiting for the past 2 days.  She reports fevers.  Denies 

any abdominal pain.  No changes in her bowel or bladder habits.  No chest pain 

or shortness of breath.  She has a known ulcer of her left heel.  Has noted 

redness and swelling which tracks up the left thigh. Admits to 4-5 episodes of 

vomiting.  No diarrhea or constipation.  No black or bloody stools.  Patient was

taken and a [facility].  Full workup was performed.  Patient was given 

800 mL bolus of normal saline, vancomycin and Zosyn for sepsis secondary to left

lower extremity cellulitis.  chest x-ray and x-ray of the patient's left foot 

was performed.  Left foot x-ray demonstrates no acute findings.  X-ray 

demonstrates small nodular density projecting over the left apex.  Patient was 

then transferred to our facility for further evaluation.  She was recently 

hospitalized in March here and this is when the daughter stated that she 

developed this wound.  Patient denies any pain in her lower extremity.  No 

history of DVT or PE.  There are no other alleviating, precipitating or 

modifying factors





- Related Data


                                Home Medications











 Medication  Instructions  Recorded  Confirmed


 


Multivitamin [Multivitamins] 1 tab PO DAILY 05/01/14 07/08/20


 


Ascorbic Acid [Vitamin C] 500 mg PO BID 05/08/14 07/08/20


 


Liraglutide [Victoza 3-Fredrick] 1.8 mg SQ DAILY 05/26/20 07/08/20


 


Lisinopril [Zestril] 10 mg PO DAILY 05/26/20 07/08/20


 


Tolterodine Tartrate [Detrol LA] 4 mg PO DAILY 05/26/20 07/08/20


 


Metoprolol Tartrate 12.5 mg PO BID 07/08/20 07/08/20








                                  Previous Rx's











 Medication  Instructions  Recorded


 


Aspirin 81 mg PO DAILY #30 chew 05/30/20


 


Furosemide [Lasix] 40 mg PO DAILY #30 tab 05/30/20


 


Nitroglycerin Sl Tabs [Nitrostat] 0.4 mg SUBLINGUAL Q5M PRN #25 tab 05/30/20


 


Prasugrel [Effient] 10 mg PO DAILY #30 tab 05/30/20


 


metFORMIN HCL [Glucophage] 500 mg PO TID #0 05/30/20


 


Cephalexin [Keflex] 500 mg PO Q6HR #40 cap 07/13/20











                                    Allergies











Allergy/AdvReac Type Severity Reaction Status Date / Time


 


sulfamethoxazole Allergy  Unknown Verified 07/08/20 16:49





[From Bactrim]     


 


trimethoprim [From Bactrim] Allergy  Unknown Verified 07/08/20 16:49














Review of Systems


ROS Statement: 


Those systems with pertinent positive or pertinent negative responses have been 

documented in the HPI.





ROS Other: All systems not noted in ROS Statement are negative.





Past Medical History


Past Medical History: Diabetes Mellitus, Hypertension


Additional Past Medical History / Comment(s): neuropathy; wound L Leg


History of Any Multi-Drug Resistant Organisms: MRSA


Date of last positivie culture/infection: 2014


MDRO Source:: left foot


Past Surgical History: Back Surgery, Cholecystectomy, Hysterectomy, Orthopedic 

Surgery


Past Anesthesia/Blood Transfusion Reactions: No Reported Reaction


Past Psychological History: No Psychological Hx Reported


Smoking Status: Never smoker


Past Alcohol Use History: Occasional


Past Drug Use History: None Reported





- Past Family History


  ** Father


Family Medical History: Hypertension


Additional Family Medical History / Comment(s): heart attack





  ** Mother


Family Medical History: Diabetes Mellitus





General Exam


Limitations: no limitations


General appearance: alert, in no apparent distress


Head exam: Present: atraumatic, normocephalic, normal inspection


Eye exam: Present: normal appearance, PERRL, EOMI.  Absent: scleral icterus, 

conjunctival injection, periorbital swelling


ENT exam: Present: normal exam, mucous membranes moist


Neck exam: Present: normal inspection.  Absent: tenderness, meningismus, 

lymphadenopathy


Respiratory exam: Present: normal lung sounds bilaterally.  Absent: respiratory 

distress, wheezes, rales, rhonchi, stridor


Cardiovascular Exam: Present: regular rate, normal rhythm, normal heart sounds. 

Absent: systolic murmur, diastolic murmur, rubs, gallop, clicks


GI/Abdominal exam: Present: soft, normal bowel sounds.  Absent: distended, 

tenderness, guarding, rebound, rigid


Extremities exam: Present: tenderness, normal capillary refill, pedal edema, 

other (brawny edema b/l lower extremities. left lower extremity is warm, swollen

with red streaking to the skin consistent with cellulitis which extends up to 

mid thigh. compartments are soft. 2+ DP and PT pulses. ulcer left heel - plantar

aspect measuring 3 x 3 cm. ).  Absent: joint swelling, calf tenderness


Back exam: Present: normal inspection


Neurological exam: Present: alert, oriented X3, CN II-XII intact


Psychiatric exam: Present: normal affect, normal mood


Skin exam: Present: warm, dry, intact, normal color.  Absent: rash





Course


                                   Vital Signs











  07/08/20 07/08/20





  15:13 16:51


 


Temperature 100.1 F H 102.8 F H


 


Pulse Rate 96 104 H


 


Respiratory 18 18





Rate  


 


Blood Pressure 141/69 152/78


 


O2 Sat by Pulse 97 99





Oximetry  














Medical Decision Making





- Medical Decision Making


Upon arrival the patient was placed into room 24.  Thorough history and physical

exam was performed.  I did review the patient's packet.  White blood cell count 

is 21,000.  Sodium 130.  Glucose 250.  The patient was admitted to [facility] [place] 

physicians.  I discussed case with Dr. HICKEY.  Patient remained in stable condition 

and was transported to the floor





- Lab Data


Result diagrams: 


                                 07/12/20 07:15





                                 07/12/20 07:15





Disposition


Clinical Impression: 


 Diabetic ulcer of heel, Congestive heart failure, Morbid obesity due to excess 

calories, Cellulitis





Disposition: ADMITTED AS IP TO THIS HOSP


Condition: Stable


Is patient prescribed a controlled substance at d/c from ED?: No


Decision to Admit Reason: Admit from EC


Decision Date: 07/08/20

## 2020-07-08 NOTE — P.HPIM
History of Present Illness


H&P Date: 07/08/20


Chief Complaint: Cellulitis





65-year-old female with PMH of CAD post stent, hypertension, diabetes mellitus 

presents to the ED for constellation of symptoms.  Patient reports a 2 day 

history of lightheadedness, generalized weakness, nausea and fever.  These 

symptoms prompted her to come to the ED.  She was seen at an outside hospital.  

She does report ulcer on her left heel.  Patient reports worsening erythema and 

warmth of her bilateral lower extremities.  At the outside hospital, she was 

given an 800 mL bolus of normal saline, vancomycin and Zosyn.  Foot x-ray showed

no acute findings.  CBC showed leukocytosis of 21.2.  CMP showed sodium of 130, 

potassium of 5.1, BUN of 25, creatinine of 1.3, total bilirubin of 1.4 and 

glucose of 250.  Chest x-ray showed small nodular density over the left apex.  

In the ED here, vital signs showed T-max of 102.8 Fahrenheit and tachycardia 

with heart rate of 104.  Patient is admitted for sepsis related to cellulitis 

and ID consultation.





Review of Systems





Pertinent positives and negatives as discussed in HPI, a complete review of 

systems was performed and all other systems are negative.





Past Medical History


Past Medical History: Diabetes Mellitus, Hypertension


Additional Past Medical History / Comment(s): neuropathy; wound L Leg


History of Any Multi-Drug Resistant Organisms: MRSA


Date of last positivie culture/infection: 2014


MDRO Source:: left foot


Past Surgical History: Back Surgery, Cholecystectomy, Hysterectomy, Orthopedic 

Surgery


Past Anesthesia/Blood Transfusion Reactions: No Reported Reaction


Past Psychological History: No Psychological Hx Reported


Smoking Status: Never smoker


Past Alcohol Use History: Occasional


Past Drug Use History: None Reported





- Past Family History


  ** Father


Family Medical History: Hypertension


Additional Family Medical History / Comment(s): heart attack





  ** Mother


Family Medical History: Diabetes Mellitus





Medications and Allergies


                                Home Medications











 Medication  Instructions  Recorded  Confirmed  Type


 


Multivitamin [Multivitamins] 1 tab PO DAILY 05/01/14 07/08/20 History


 


Ascorbic Acid [Vitamin C] 500 mg PO BID 05/08/14 07/08/20 History


 


Liraglutide [Victoza 3-Fredrick] 1.8 mg SQ DAILY 05/26/20 07/08/20 History


 


Lisinopril [Zestril] 10 mg PO DAILY 05/26/20 07/08/20 History


 


Tolterodine Tartrate [Detrol LA] 4 mg PO DAILY 05/26/20 07/08/20 History


 


Aspirin 81 mg PO DAILY #30 chew 05/30/20 07/08/20 Rx


 


Furosemide [Lasix] 40 mg PO DAILY #30 tab 05/30/20 07/08/20 Rx


 


Nitroglycerin Sl Tabs [Nitrostat] 0.4 mg SUBLINGUAL Q5M PRN #25 tab 05/30/20 07/08/20 Rx


 


Prasugrel [Effient] 10 mg PO DAILY #30 tab 05/30/20 07/08/20 Rx


 


metFORMIN HCL [Glucophage] 500 mg PO TID #0 05/30/20 07/08/20 Rx


 


Metoprolol Tartrate 12.5 mg PO BID 07/08/20 07/08/20 History








                                    Allergies











Allergy/AdvReac Type Severity Reaction Status Date / Time


 


sulfamethoxazole Allergy  Unknown Verified 07/08/20 16:49





[From Bactrim]     


 


trimethoprim [From Bactrim] Allergy  Unknown Verified 07/08/20 16:49














Physical Exam


Vitals: 


                                   Vital Signs











  Temp Pulse Resp BP Pulse Ox


 


 07/08/20 16:51  102.8 F H  104 H  18  152/78  99


 


 07/08/20 15:13  100.1 F H  96  18  141/69  97








                                Intake and Output











 07/08/20 07/08/20 07/08/20





 06:59 14:59 22:59


 


Other:   


 


  Weight   136.078 kg














General: [non toxic], [no distress, lethargic], [appears at stated age], 

morbidly obese


Derm: [warm], [dry]


Head: [atraumatic], [normocephalic], [symmetric]


Eyes: [EOMI], [no lid lag], [anicteric sclera]


Mouth: [no lip lesion], [mucus membranes moist]


Cardiovascular: [S1S2 reg], [tachycardic], [positive DP pulse bilateral], 


Lungs: [CTA bilateral], [no rhonchi, no rales] , [no accessory muscle use]


Abdominal: [soft], [ nontender to palpation], [no guarding], [no appreciable 

organomegaly]


Ext: [no gross muscle atrophy], [no edema], [no contractures], left foot heel 

ulcer with no erythema or drainage, bilateral erythema extending to the knees 

with chronic venous stasis changes and xerosis


Neuro: [ CN II-XI grossly intact], [no focal neuro deficits]


Psych: [Alert], [oriented], [appropriate affect] 





Assessment and Plan


Assessment: 





Sepsis related to cellulitis


Acute kidney injury


Hyperkalemia


Abnormal chest x-ray


CAD post stent


Diabetes mellitus with hyperglycemia


Hypertension





Patient meets sepsis criteria.  She has a positive source of infection with 

leukocytosis and tachycardia.  She will be started on Rocephin and vancomycin.  

Infectious disease will be consulted.  Tylenol as needed for fever or chills.  

Start normal saline at 100 mL/h.  Obtain blood culture.  Telemetry monitoring.  

Check lactic acid.





BUN 25, creatinine 1.3.  Unknown baseline.  Likely related to dehydration.  

Continue IV hydration as above.  Avoid nephrotoxins.  Repeat BMP tomorrow 

morning.





Potassium of 5.1.  Unknown etiology.  Plans to hold lisinopril and repeat BMP 

tomorrow morning.





Nodularity seen on chest x-ray from outside hospital.  Patient will need CT 

chest in the outpatient setting with PCP.





Continue aspirin, metoprolol, parasugrel.  Unsure why patient is not on Lipitor.





Blood glucose 250.  Start insulin sliding scale.  Regular Accu-Cheks with 

hypoglycemic precautions.





We will continue metoprolol for her hypertension.  Her vital signs are being 

monitored and medications adjusted if necessary.





DVT prophylaxis: [Heparin]


Discussed with: [Patient and niece]


Anticipated discharge: [2-3 days]


Anticipated discharge place: [Home]


A total of [45] minutes was spent on the care of this complex patient more than 

50% of the time was spent in counseling and care coordination. 





Patient names her niece Dee decision maker if she can't make decisions for 

herself.  Patient would like to be no code.

## 2020-07-09 LAB
ALBUMIN SERPL-MCNC: 2.8 G/DL (ref 3.5–5)
ALP SERPL-CCNC: 94 U/L (ref 38–126)
ALT SERPL-CCNC: 13 U/L (ref 4–34)
ANION GAP SERPL CALC-SCNC: 6 MMOL/L
AST SERPL-CCNC: 25 U/L (ref 14–36)
BASOPHILS # BLD AUTO: 0 K/UL (ref 0–0.2)
BASOPHILS NFR BLD AUTO: 0 %
BUN SERPL-SCNC: 22 MG/DL (ref 7–17)
CALCIUM SPEC-MCNC: 8 MG/DL (ref 8.4–10.2)
CHLORIDE SERPL-SCNC: 99 MMOL/L (ref 98–107)
CO2 SERPL-SCNC: 27 MMOL/L (ref 22–30)
EOSINOPHIL # BLD AUTO: 0 K/UL (ref 0–0.7)
EOSINOPHIL NFR BLD AUTO: 0 %
ERYTHROCYTE [DISTWIDTH] IN BLOOD BY AUTOMATED COUNT: 4.39 M/UL (ref 3.8–5.4)
ERYTHROCYTE [DISTWIDTH] IN BLOOD: 15.7 % (ref 11.5–15.5)
GLUCOSE BLD-MCNC: 205 MG/DL (ref 75–99)
GLUCOSE BLD-MCNC: 214 MG/DL (ref 75–99)
GLUCOSE BLD-MCNC: 221 MG/DL (ref 75–99)
GLUCOSE BLD-MCNC: 228 MG/DL (ref 75–99)
GLUCOSE BLD-MCNC: 241 MG/DL (ref 75–99)
GLUCOSE SERPL-MCNC: 192 MG/DL (ref 74–99)
HBA1C MFR BLD: 7 % (ref 4–6)
HCT VFR BLD AUTO: 35.9 % (ref 34–46)
HGB BLD-MCNC: 11.6 GM/DL (ref 11.4–16)
LYMPHOCYTES # SPEC AUTO: 1.3 K/UL (ref 1–4.8)
LYMPHOCYTES NFR SPEC AUTO: 9 %
MCH RBC QN AUTO: 26.4 PG (ref 25–35)
MCHC RBC AUTO-ENTMCNC: 32.3 G/DL (ref 31–37)
MCV RBC AUTO: 81.8 FL (ref 80–100)
MONOCYTES # BLD AUTO: 0.4 K/UL (ref 0–1)
MONOCYTES NFR BLD AUTO: 3 %
NEUTROPHILS # BLD AUTO: 11.9 K/UL (ref 1.3–7.7)
NEUTROPHILS NFR BLD AUTO: 86 %
PLATELET # BLD AUTO: 234 K/UL (ref 150–450)
POTASSIUM SERPL-SCNC: 4.2 MMOL/L (ref 3.5–5.1)
PROT SERPL-MCNC: 5.7 G/DL (ref 6.3–8.2)
SODIUM SERPL-SCNC: 132 MMOL/L (ref 137–145)
WBC # BLD AUTO: 13.9 K/UL (ref 3.8–10.6)

## 2020-07-09 RX ADMIN — CEFAZOLIN SCH: 330 INJECTION, POWDER, FOR SOLUTION INTRAMUSCULAR; INTRAVENOUS at 06:10

## 2020-07-09 RX ADMIN — FUROSEMIDE SCH MG: 40 TABLET ORAL at 08:27

## 2020-07-09 RX ADMIN — ASPIRIN 81 MG CHEWABLE TABLET SCH MG: 81 TABLET CHEWABLE at 08:27

## 2020-07-09 RX ADMIN — INSULIN ASPART SCH UNIT: 100 INJECTION, SOLUTION INTRAVENOUS; SUBCUTANEOUS at 21:35

## 2020-07-09 RX ADMIN — METOPROLOL TARTRATE SCH: 25 TABLET, FILM COATED ORAL at 06:10

## 2020-07-09 RX ADMIN — HEPARIN SODIUM SCH UNIT: 5000 INJECTION, SOLUTION INTRAVENOUS; SUBCUTANEOUS at 21:27

## 2020-07-09 RX ADMIN — HEPARIN SODIUM SCH UNIT: 5000 INJECTION, SOLUTION INTRAVENOUS; SUBCUTANEOUS at 08:27

## 2020-07-09 RX ADMIN — OXYBUTYNIN CHLORIDE SCH MG: 10 TABLET, EXTENDED RELEASE ORAL at 08:27

## 2020-07-09 RX ADMIN — CEFAZOLIN SCH MLS/HR: 330 INJECTION, POWDER, FOR SOLUTION INTRAMUSCULAR; INTRAVENOUS at 08:29

## 2020-07-09 RX ADMIN — HEPARIN SODIUM SCH: 5000 INJECTION, SOLUTION INTRAVENOUS; SUBCUTANEOUS at 06:10

## 2020-07-09 RX ADMIN — INSULIN ASPART SCH UNIT: 100 INJECTION, SOLUTION INTRAVENOUS; SUBCUTANEOUS at 08:27

## 2020-07-09 RX ADMIN — METOPROLOL TARTRATE SCH MG: 25 TABLET, FILM COATED ORAL at 08:27

## 2020-07-09 RX ADMIN — METOPROLOL TARTRATE SCH MG: 25 TABLET, FILM COATED ORAL at 21:27

## 2020-07-09 RX ADMIN — INSULIN ASPART SCH UNIT: 100 INJECTION, SOLUTION INTRAVENOUS; SUBCUTANEOUS at 17:48

## 2020-07-09 RX ADMIN — PRASUGREL SCH MG: 10 TABLET, FILM COATED ORAL at 08:27

## 2020-07-09 RX ADMIN — INSULIN ASPART SCH: 100 INJECTION, SOLUTION INTRAVENOUS; SUBCUTANEOUS at 06:10

## 2020-07-09 RX ADMIN — INSULIN ASPART SCH UNIT: 100 INJECTION, SOLUTION INTRAVENOUS; SUBCUTANEOUS at 12:14

## 2020-07-09 NOTE — P.PN
Subjective


Progress Note Date: 07/09/20





Patient is doing fairly well today.  No acute events overnight.





Objective





- Vital Signs


Vital signs: 


                                   Vital Signs











Temp  98.3 F   07/09/20 07:00


 


Pulse  81   07/09/20 07:00


 


Resp  18   07/09/20 07:00


 


BP  126/68   07/09/20 07:00


 


Pulse Ox  96   07/09/20 07:00








                                 Intake & Output











 07/08/20 07/09/20 07/09/20





 18:59 06:59 18:59


 


Intake Total  100 


 


Output Total  200 


 


Balance  -100 


 


Weight 136.078 kg  136.078 kg


 


Intake:   


 


  Oral  100 


 


Output:   


 


  Urine  200 


 


Other:   


 


  # Voids 1 1 














- Exam





General:  The patient is awake and alert, in no distress


Eye: there is normal conjunctiva bilaterally.  


Neck:  The neck is supple, there is no  JVD.  


Cardiovascular:  Normal  S1-S2, no S3-S4, no murmurs.


Respiratory: Lungs clear to auscultation bilaterally


Gastrointestinal: Abdomen is soft, nontender 


Neurological:. Speech is normal. 


Skin:  Of the lower extremities very dry with evidence of chronic venous 

insufficiency stasis and lymphedema.  Left foot ulcer


/Clean dressing.





- Labs


CBC & Chem 7: 


                                 07/09/20 07:10





                                 07/09/20 07:10


Labs: 


                  Abnormal Lab Results - Last 24 Hours (Table)











  07/09/20 07/09/20 07/09/20 Range/Units





  07:10 07:10 07:32 


 


WBC  13.9 H    (3.8-10.6)  k/uL


 


RDW  15.7 H    (11.5-15.5)  %


 


Neutrophils #  11.9 H    (1.3-7.7)  k/uL


 


Sodium   132 L   (137-145)  mmol/L


 


BUN   22 H   (7-17)  mg/dL


 


Creatinine   1.16 H   (0.52-1.04)  mg/dL


 


Glucose   192 H   (74-99)  mg/dL


 


POC Glucose (mg/dL)    228 H  (75-99)  mg/dL


 


Calcium   8.0 L   (8.4-10.2)  mg/dL


 


Total Protein   5.7 L   (6.3-8.2)  g/dL


 


Albumin   2.8 L   (3.5-5.0)  g/dL














  07/09/20 Range/Units





  11:57 


 


WBC   (3.8-10.6)  k/uL


 


RDW   (11.5-15.5)  %


 


Neutrophils #   (1.3-7.7)  k/uL


 


Sodium   (137-145)  mmol/L


 


BUN   (7-17)  mg/dL


 


Creatinine   (0.52-1.04)  mg/dL


 


Glucose   (74-99)  mg/dL


 


POC Glucose (mg/dL)  214 H  (75-99)  mg/dL


 


Calcium   (8.4-10.2)  mg/dL


 


Total Protein   (6.3-8.2)  g/dL


 


Albumin   (3.5-5.0)  g/dL














Assessment and Plan


Assessment: 








1.  Chronic pressure ulcer of the left heel, stage II with acute infection and 

surrounding cellulitis, currently on IV ceftriaxone and vancomycin.  Awaiting 

infectious disease evaluation.  Seen and evaluated by wound care.  Appreciate 

recommendations.


2.  Sepsis without septic shock, improved with IV fluid hydration and 

antibiotic.  Blood culture pending


3.  Acute kidney injury, resolved with IV fluid hydration


4.  Hyperkalemia resolved.  Lisinopril currently on hold.


5.  Essential hypertension, blood pressure within acceptable range


6.  Coronary artery disease with history of stent placement, continue medical 

management


7.  Type 2 diabetes: Hold oral lesions and continue sliding scale insulin.  A1c 

ordered.


8.  Abnormal chest x-ray with nodularity noted.  Require outpatient follow-up 

computed tomography scan for further evaluation

## 2020-07-09 NOTE — P.CONS
History of Present Illness





- Reason for Consult


Consult date: 07/09/20


Wound care





- History of Present Illness


This is a 65-year-old pleasant  female who has been seen in the wound 

care center in the past.  Patient being seen today for a nonhealing ulceration 

to the left calcaneus plantar aspect.  The ulceration is a stage II pressure 

ulcer.  Patient states that she's had the ulcer for a while now.  Previously 

she's had the same ulcer that was treated in the wound care center.  Patient was

not happy with the care at that time and now does not have transportation to 

come to wound care.  Patient did have the ulceration healed previously from a 

wound care doctor in [place] multiple years ago.  Patient has a crow boot at 

home.  However she has not been wearing it.  Patient states that she presented 

to the hospital yesterday due to weakness and changes in mental status.  Patient

's past medical history is significant for diabetes and hypertension patient has

neuropathy to bilateral lower extremities.  She's also had MRSA in the left foot

in 2014.








Review of Systems


Review Of Systems:


Constitutional: No fever, no chills, no night sweats.  No weight change.  No 

weakness, fatigue or lethargy.  No daytime sleepiness.


Integumentary:reports wounds, no lesions.  No rash or pruritus.  No unusual 

bruising.  No change in hair or nails.








Past Medical History


Past Medical History: Diabetes Mellitus, Hypertension


Additional Past Medical History / Comment(s): neuropathy; wound L Leg


History of Any Multi-Drug Resistant Organisms: MRSA


Year Discovered:: 2014


MDRO Source:: left foot


Past Surgical History: Back Surgery, Cholecystectomy, Hysterectomy, Orthopedic 

Surgery


Past Anesthesia/Blood Transfusion Reactions: No Reported Reaction


Past Psychological History: No Psychological Hx Reported


Smoking Status: Never smoker


Past Alcohol Use History: Occasional


Past Drug Use History: None Reported





- Past Family History


  ** Father


Family Medical History: Hypertension


Additional Family Medical History / Comment(s): heart attack





  ** Mother


Family Medical History: Diabetes Mellitus





Medications and Allergies


                                Home Medications











 Medication  Instructions  Recorded  Confirmed  Type


 


Multivitamin [Multivitamins] 1 tab PO DAILY 05/01/14 07/08/20 History


 


Ascorbic Acid [Vitamin C] 500 mg PO BID 05/08/14 07/08/20 History


 


Liraglutide [Victoza 3-Fredrick] 1.8 mg SQ DAILY 05/26/20 07/08/20 History


 


Lisinopril [Zestril] 10 mg PO DAILY 05/26/20 07/08/20 History


 


Tolterodine Tartrate [Detrol LA] 4 mg PO DAILY 05/26/20 07/08/20 History


 


Aspirin 81 mg PO DAILY #30 chew 05/30/20 07/08/20 Rx


 


Furosemide [Lasix] 40 mg PO DAILY #30 tab 05/30/20 07/08/20 Rx


 


Nitroglycerin Sl Tabs [Nitrostat] 0.4 mg SUBLINGUAL Q5M PRN #25 tab 05/30/20 07/08/20 Rx


 


Prasugrel [Effient] 10 mg PO DAILY #30 tab 05/30/20 07/08/20 Rx


 


metFORMIN HCL [Glucophage] 500 mg PO TID #0 05/30/20 07/08/20 Rx


 


Metoprolol Tartrate 12.5 mg PO BID 07/08/20 07/08/20 History








                                    Allergies











Allergy/AdvReac Type Severity Reaction Status Date / Time


 


sulfamethoxazole Allergy  Unknown Verified 07/08/20 16:49





[From Bactrim]     


 


trimethoprim [From Bactrim] Allergy  Unknown Verified 07/08/20 16:49














Physical Exam


Vitals: 


                                   Vital Signs











  Temp Pulse Pulse Resp BP BP Pulse Ox


 


 07/09/20 07:00  98.3 F   81  18   126/68  96


 


 07/09/20 02:51     18   


 


 07/09/20 02:38  98.9 F      


 


 07/09/20 01:44  98.4 F   76  18   123/71  96


 


 07/08/20 23:36     16   


 


 07/08/20 21:35  99.9 F H      


 


 07/08/20 19:03  100.7 F H   85  18   119/63  96


 


 07/08/20 18:27  100.5 F H   84  18   97/59  93 L


 


 07/08/20 16:51  102.8 F H  104 H   18  152/78   99


 


 07/08/20 15:13  100.1 F H  96   18  141/69   97








                                Intake and Output











 07/08/20 07/09/20 07/09/20





 22:59 06:59 14:59


 


Intake Total 100  


 


Output Total  200 


 


Balance 100 -200 


 


Intake:   


 


  Oral 100  


 


Output:   


 


  Urine  200 


 


Other:   


 


  # Voids 1 1 


 


  Weight 136.078 kg  136.078 kg











Physical exam:


General Appearance: Alert, cooperative, no distress, appears stated age.


Skin: Stage II nonhealing pressure ulcer left calcaneus plantar aspect with fat 

layer exposure measuring approximately 3 x 2 x 0.3 cm.  No tunneling or 

undermining noted, wound edges attached wound bed, wound bed shows significant 

amount of slough and minimal granulation.  Purulent drainage noted to the site, 

periwound shows callus and minimal erythema.  all other Skin color, texture, 

tugor normal, no rashes or lesions.


Neurologic: Alert oriented x3 








Results


CBC & Chem 7: 


                                 07/09/20 07:10





                                 07/09/20 07:10


Labs: 


                  Abnormal Lab Results - Last 24 Hours (Table)











  07/09/20 07/09/20 07/09/20 Range/Units





  07:10 07:10 07:32 


 


WBC  13.9 H    (3.8-10.6)  k/uL


 


RDW  15.7 H    (11.5-15.5)  %


 


Neutrophils #  11.9 H    (1.3-7.7)  k/uL


 


Sodium   132 L   (137-145)  mmol/L


 


BUN   22 H   (7-17)  mg/dL


 


Creatinine   1.16 H   (0.52-1.04)  mg/dL


 


Glucose   192 H   (74-99)  mg/dL


 


POC Glucose (mg/dL)    228 H  (75-99)  mg/dL


 


Calcium   8.0 L   (8.4-10.2)  mg/dL


 


Total Protein   5.7 L   (6.3-8.2)  g/dL


 


Albumin   2.8 L   (3.5-5.0)  g/dL














Assessment and Plan


(1) Pressure ulcer of left heel, stage 2


Current Visit: Yes   Status: Acute   Code(s): L89.622 - PRESSURE ULCER OF LEFT 

HEEL, STAGE 2   SNOMED Code(s): 374610168


   





(2) Type 2 diabetes mellitus with diabetic ulcer of left lower leg


Current Visit: No   Status: Acute   Code(s): E11.622 - TYPE 2 DIABETES MELLITUS 

WITH OTHER SKIN ULCER   SNOMED Code(s): 356533396


   





(3) Venous stasis dermatitis of both lower extremities


Current Visit: No   Status: Acute   Code(s): I83.11 - VARICOSE VEINS OF RIGHT L

OWER EXTREMITY WITH INFLAMMATION   SNOMED Code(s): 12205605


   


Plan: 


Apply honey alginate, feeling was gauze, dry gauze, rolled gauze secured paper 

tape.  May secure with a Ace elastic wrap.  Nonweightbearing to left calcaneus. 

utilizes a walker for ambulation.  Discussed with patient possible need for 

wound care.  Patient declined at this time due to previous experience in the 

wound care center and distance.  Patient lives alone and would not be able to 

make wound care appointments.  Patient instructed to utilize Crow boot when 

returning home.  Discussed to return to [facility] to assist with sit.  

Discussed the importance of continuing nonweightbearing status and utilizing 

walker at home.  Dressing changes can resume with home care and primary care 

physician if needed.





Thank you kindly for the consultation any questions please contact the wound 

care center





DNP note has been reviewed and discussed with Dr. Nice and the impression 

and plan of care has been directed as dictated.

## 2020-07-10 LAB
ANION GAP SERPL CALC-SCNC: 6 MMOL/L
BASOPHILS # BLD AUTO: 0.1 K/UL (ref 0–0.2)
BASOPHILS NFR BLD AUTO: 1 %
BUN SERPL-SCNC: 25 MG/DL (ref 7–17)
CALCIUM SPEC-MCNC: 8.2 MG/DL (ref 8.4–10.2)
CHLORIDE SERPL-SCNC: 103 MMOL/L (ref 98–107)
CO2 SERPL-SCNC: 25 MMOL/L (ref 22–30)
EOSINOPHIL # BLD AUTO: 0.1 K/UL (ref 0–0.7)
EOSINOPHIL NFR BLD AUTO: 1 %
ERYTHROCYTE [DISTWIDTH] IN BLOOD BY AUTOMATED COUNT: 4.47 M/UL (ref 3.8–5.4)
ERYTHROCYTE [DISTWIDTH] IN BLOOD: 15.7 % (ref 11.5–15.5)
GLUCOSE BLD-MCNC: 158 MG/DL (ref 75–99)
GLUCOSE BLD-MCNC: 231 MG/DL (ref 75–99)
GLUCOSE BLD-MCNC: 250 MG/DL (ref 75–99)
GLUCOSE BLD-MCNC: 260 MG/DL (ref 75–99)
GLUCOSE SERPL-MCNC: 161 MG/DL (ref 74–99)
HCT VFR BLD AUTO: 37.7 % (ref 34–46)
HGB BLD-MCNC: 11.3 GM/DL (ref 11.4–16)
LYMPHOCYTES # SPEC AUTO: 1.9 K/UL (ref 1–4.8)
LYMPHOCYTES NFR SPEC AUTO: 22 %
MCH RBC QN AUTO: 25.3 PG (ref 25–35)
MCHC RBC AUTO-ENTMCNC: 29.9 G/DL (ref 31–37)
MCV RBC AUTO: 84.4 FL (ref 80–100)
MONOCYTES # BLD AUTO: 0.5 K/UL (ref 0–1)
MONOCYTES NFR BLD AUTO: 5 %
NEUTROPHILS # BLD AUTO: 5.7 K/UL (ref 1.3–7.7)
NEUTROPHILS NFR BLD AUTO: 67 %
PLATELET # BLD AUTO: 228 K/UL (ref 150–450)
POTASSIUM SERPL-SCNC: 4.4 MMOL/L (ref 3.5–5.1)
SODIUM SERPL-SCNC: 134 MMOL/L (ref 137–145)
WBC # BLD AUTO: 8.5 K/UL (ref 3.8–10.6)

## 2020-07-10 RX ADMIN — INSULIN DETEMIR SCH UNIT: 100 INJECTION, SOLUTION SUBCUTANEOUS at 11:45

## 2020-07-10 RX ADMIN — ASPIRIN 81 MG CHEWABLE TABLET SCH MG: 81 TABLET CHEWABLE at 07:51

## 2020-07-10 RX ADMIN — INSULIN ASPART SCH UNIT: 100 INJECTION, SOLUTION INTRAVENOUS; SUBCUTANEOUS at 17:41

## 2020-07-10 RX ADMIN — METOPROLOL TARTRATE SCH MG: 25 TABLET, FILM COATED ORAL at 20:43

## 2020-07-10 RX ADMIN — HEPARIN SODIUM SCH UNIT: 5000 INJECTION, SOLUTION INTRAVENOUS; SUBCUTANEOUS at 07:53

## 2020-07-10 RX ADMIN — INSULIN ASPART SCH UNIT: 100 INJECTION, SOLUTION INTRAVENOUS; SUBCUTANEOUS at 20:43

## 2020-07-10 RX ADMIN — INSULIN ASPART SCH UNIT: 100 INJECTION, SOLUTION INTRAVENOUS; SUBCUTANEOUS at 07:53

## 2020-07-10 RX ADMIN — OXYBUTYNIN CHLORIDE SCH MG: 10 TABLET, EXTENDED RELEASE ORAL at 07:52

## 2020-07-10 RX ADMIN — HEPARIN SODIUM SCH UNIT: 5000 INJECTION, SOLUTION INTRAVENOUS; SUBCUTANEOUS at 20:43

## 2020-07-10 RX ADMIN — INSULIN ASPART SCH UNIT: 100 INJECTION, SOLUTION INTRAVENOUS; SUBCUTANEOUS at 11:44

## 2020-07-10 RX ADMIN — SODIUM CHLORIDE SCH MLS/HR: 9 INJECTION, SOLUTION INTRAVENOUS at 21:24

## 2020-07-10 RX ADMIN — METOPROLOL TARTRATE SCH MG: 25 TABLET, FILM COATED ORAL at 07:52

## 2020-07-10 RX ADMIN — PRASUGREL SCH MG: 10 TABLET, FILM COATED ORAL at 07:52

## 2020-07-10 RX ADMIN — FUROSEMIDE SCH MG: 40 TABLET ORAL at 07:51

## 2020-07-10 NOTE — P.PN
Subjective





Patient is doing fairly well today.  No acute events overnight.





Objective





- Vital Signs


Vital signs: 


                                   Vital Signs











Temp  98.2 F   07/10/20 07:00


 


Pulse  78   07/10/20 07:00


 


Resp  16   07/10/20 08:00


 


BP  135/54   07/10/20 07:00


 


Pulse Ox  91 L  07/10/20 07:00








                                 Intake & Output











 07/09/20 07/10/20 07/10/20





 18:59 06:59 18:59


 


Output Total 1600 1200 


 


Balance -1600 -1200 


 


Weight 136.078 kg  


 


Output:   


 


  Urine 1600 1200 














- Exam





General:  The patient is awake and alert, in no distress


Eye: there is normal conjunctiva bilaterally.  


Neck:  The neck is supple, there is no  JVD.  


Cardiovascular:  Normal  S1-S2, no S3-S4, no murmurs.


Respiratory: Lungs clear to auscultation bilaterally


Gastrointestinal: Abdomen is soft, nontender 


Neurological:. Speech is normal. 


Skin:  Of the lower extremities very dry with evidence of chronic venous 

insufficiency stasis and lymphedema.  Left foot ulcer


/Clean dressing.





- Labs


CBC & Chem 7: 


                                 07/10/20 07:14





                                 07/10/20 07:09


Labs: 


                  Abnormal Lab Results - Last 24 Hours (Table)











  07/08/20 07/09/20 07/09/20 Range/Units





  21:04 07:10 11:57 


 


Hgb     (11.4-16.0)  gm/dL


 


MCHC     (31.0-37.0)  g/dL


 


RDW     (11.5-15.5)  %


 


Sodium     (137-145)  mmol/L


 


BUN     (7-17)  mg/dL


 


Glucose     (74-99)  mg/dL


 


POC Glucose (mg/dL)  205 H   214 H  (75-99)  mg/dL


 


Hemoglobin A1c   7.0 H   (4.0-6.0)  %


 


Calcium     (8.4-10.2)  mg/dL














  07/09/20 07/09/20 07/10/20 Range/Units





  17:21 21:35 07:09 


 


Hgb     (11.4-16.0)  gm/dL


 


MCHC     (31.0-37.0)  g/dL


 


RDW     (11.5-15.5)  %


 


Sodium    134 L  (137-145)  mmol/L


 


BUN    25 H  (7-17)  mg/dL


 


Glucose    161 H  (74-99)  mg/dL


 


POC Glucose (mg/dL)  241 H  221 H   (75-99)  mg/dL


 


Hemoglobin A1c     (4.0-6.0)  %


 


Calcium    8.2 L  (8.4-10.2)  mg/dL














  07/10/20 07/10/20 Range/Units





  07:12 07:14 


 


Hgb   11.3 L  (11.4-16.0)  gm/dL


 


MCHC   29.9 L  (31.0-37.0)  g/dL


 


RDW   15.7 H  (11.5-15.5)  %


 


Sodium    (137-145)  mmol/L


 


BUN    (7-17)  mg/dL


 


Glucose    (74-99)  mg/dL


 


POC Glucose (mg/dL)  158 H   (75-99)  mg/dL


 


Hemoglobin A1c    (4.0-6.0)  %


 


Calcium    (8.4-10.2)  mg/dL








                      Microbiology - Last 24 Hours (Table)











 07/08/20 20:00 Blood Culture - Preliminary





 Blood    No Growth after 24 hours














Assessment and Plan


Assessment: 








1.  Chronic pressure ulcer of the left heel, stage II with acute infection and 

surrounding cellulitis, currently on IV ceftriaxone and vancomycin. Seen and 

evaluated by infectious disease and wound care.  Appreciate recommendations.  

Computed tomography scan of the foot showed no evidence of abscess formation or 

osteomyelitis.


2.  Sepsis with gram-positive bacteremia without septic shock, improved with IV 

fluid hydration and antibiotic.  Blood culture at outside hospital showed gram-

positive cocci.  Repeat blood culture here are negative to date


3.  Acute kidney injury, resolved with IV fluid hydration


4.  Hyperkalemia resolved.  Lisinopril currently on hold.  Blood pressure within

acceptable range


5.  Essential hypertension, blood pressure within acceptable range


6.  Coronary artery disease with history of stent placement, continue medical 

management


7.  Type 2 diabetes: Hold oral lesions and continue sliding scale insulin.  A1c 

7.0 blood glucose not well controlled with sliding scale.  I would add Levemir 8

units once a day


8.  Abnormal chest x-ray with nodularity noted.  Require outpatient follow-up 

computed tomography scan for further evaluation





Continue current regimen otherwise.  Anticipate discharge home within the next 

couple of days pending final recommendation from ID regarding antibiotic course


Patient is not interested in following up with the wound care clinic and would 

like to have visiting nurses at home which is already set up

## 2020-07-10 NOTE — CDI
Documentation Clarification Form



Date: 07/10/2020 03:28:17 PM

From: Bev Valerio RN CCDS

Phone: [phone]

MRN: [id number]

Admit Date: 07/08/2020 03:50:00 PM

Patient Name: Stephany Ramirez

Visit Number: [id number]

Discharge Date:  





ATTENTION: The Clinical Documentation Specialists (CDI) and [facility] Coding Staff 
appreciate your assistance in clarifying documentation. Please respond to the 
clarification below the line at the bottom and electronically sign. The CDI & 
[facility] Coding staff will review the response and follow-up if needed. Please note: 
Queries are made part of the Legal Health Record. If you have any questions, 
please contact the author of this message via ITS.



Dr. Yuriy Elmore



Congestive Heart Failure is documented in ED Impressions 7/8



History/Risk Factors: 65-year-old female presents to the ED with two day history
of lightheadedness, generalized weakness, nausea and fever. Medical history HTN,
CAD post stent and DM per H&P

Clinical Indicators: 

7/8 Admission VS/Pulse OX: BP: 141/69; HR: 96; Temp: 100.1 F Oral; RR: 18; SpO2 
97% 2L nasal cannula 

5/27 Echocardiogram Results: Moderate concentric left ventricular hypertrophy. 
Overall left ventricular systolic function is severity impaired with, an EF 
between 25-30%. Right ventricle is mildly enlarged.

Treatment:  7/9 Lasix 40mg po daily; Lopressor 12.5mg bid; 



In your professional opinion, can you please clarify the acuity and type of CHF 
if known?



  Chronic Systolic Heart Failure

  Chronic Systolic & Diastolic Heart Failure

  CHF Ruled Out

  Unable to Determine

  Other, please specify________________



(Last Revision: April 2018)

___________________________________________________________________________

Chronic Systolic & Diastolic Heart Failure



MTDD

## 2020-07-10 NOTE — CT
EXAMINATION TYPE: CT foot LT wo con

 

DATE OF EXAM: 7/10/2020

 

COMPARISON: 7/8/2020

 

HISTORY: Lt heel wound and bacteremia

 

CT DLP: 181.8 mGycm

Automated exposure control for dose reduction was used.

 

CONTRAST: 

CT scan of the left foot is performed , patient injected with 75 mL of Isovue 300.

 

FINDINGS- 

Soft tissue calcifications are seen and there is arthropathy of the ankle mortise and chronic deformi
ty involving the lateral malleolus. Subtalar joint demonstrates mild arthropathy and there is a large
 calcaneal spur. No destructive changes are seen.

 

There is diffuse skin thickening and diffuse soft tissue edema throughout the ankle and visualized fo
ot. No definable abscess cavity is seen. Thickening of the plantar apical fibrosis is noted. Appears 
to be an ulceration of the skin at the level of the heel

 

IMPRESSION- 

1. Diffuse soft tissue edema, skin thickening and soft tissue ulceration overlying the plantar aspect
 of the foot adjacent to the heel. No destructive osseous changes or evidence of abscess. Correlate f
or severe cellulitis.

## 2020-07-10 NOTE — PN
PROGRESS NOTE



DATE OF SERVICE:

07/10/2020



REASON FOR FOLLOWUP:

Left heel wound with secondary cellulitis and bacteremia.



INTERVAL HISTORY:

The patient is currently afebrile, patient is breathing comfortably.  The patient

denies having any chest pain or shortness of breath, no cough, no nausea, no vomiting,

no abdominal pain or pain to the left leg area.



PHYSICAL EXAMINATION:

Blood pressure 135/54 with a pulse of 78, temperature 98.2, she is 91% on room air.

General description is an elderly female up in the chair, in no distress.

RESPIRATORY SYSTEM: Unlabored breathing, clear to auscultation anteriorly.

HEART: S1, S2.  Regular rate and rhythm.

ABDOMEN:  Soft, no tenderness.



LABS:

Hemoglobin is 11.1, white count 8.5, BUN of 25, creatinine 0.94, blood culture here are

negative, blood cultures in the outside facility were positive with gram-positive

cocci, recent pending. CT foot did not show any abscess or osteomyelitis.



DIAGNOSTIC IMPRESSION AND PLAN:

Patient with extensive left lower extremity cellulitis from the wound on the left heel

area with gram-positive bacteremia.  Will wait for the blood culture to finalize to

determine discharge antibiotics.  Continue with vancomycin and Rocephin at this point

and monitor clinical course closely.





MMODL / IJN: [id number] / Job#: [id number]

## 2020-07-10 NOTE — P.CONS
History of Present Illness





- Reason for Consult


Consult date: 07/09/20


Sepsis and worsening cellulitis


Requesting physician: Nadine Menendez





- Chief Complaint


Left leg swelling redness and fever x few days





- History of Present Illness


Patient is a 65 year  female with a past medical history significant 

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

patient has for more than a year now patient presented to [facility] with

nausea vomiting generalized weakness and a fever with her symptom has been 

getting worse for the last 2 days before she presented to the hospital.  Also 

noticed to have increasing the swelling had redness of the left leg that has 

been extending from her left heel area patient did have some dull aching pain in

the left leg with a density 4-5 or 10 and no radiation patient wound to the ER 

noticed to have a fever of 102F patient was tachycardic and did have elevated 

white count patient did have x-rays of the left foot which did not show any 

acute changes patient received vancomycin and Zosyn at that facility and 

subsequently the patient was transferred to [facility] for further 

management of underlying sepsis, and this hospital the patient also spiked a 

fever of 102F patient did have elevated white count 13,000 patient Was 

empirically started on vancomycin and Rocephin infectious disease was consulted 

for further management of antibiotic therapy subsequently the blood cultures 

that was done at [facility] came back positive with gram-positive cocci 








Review of Systems


Positive point has been  mentioned in the HPI rest of the systems are negative








Past Medical History


Past Medical History: Diabetes Mellitus, Hypertension


Additional Past Medical History / Comment(s): neuropathy; wound L Leg


History of Any Multi-Drug Resistant Organisms: MRSA


Year Discovered:: 2014


MDRO Source:: left foot


Past Surgical History: Back Surgery, Cholecystectomy, Hysterectomy, Orthopedic 

Surgery


Past Anesthesia/Blood Transfusion Reactions: No Reported Reaction


Past Psychological History: No Psychological Hx Reported


Smoking Status: Never smoker


Past Alcohol Use History: Occasional


Past Drug Use History: None Reported





- Past Family History


  ** Father


Family Medical History: Hypertension


Additional Family Medical History / Comment(s): heart attack





  ** Mother


Family Medical History: Diabetes Mellitus





Medications and Allergies


                                Home Medications











 Medication  Instructions  Recorded  Confirmed  Type


 


Multivitamin [Multivitamins] 1 tab PO DAILY 05/01/14 07/08/20 History


 


Ascorbic Acid [Vitamin C] 500 mg PO BID 05/08/14 07/08/20 History


 


Liraglutide [Victoza 3-Fredrick] 1.8 mg SQ DAILY 05/26/20 07/08/20 History


 


Lisinopril [Zestril] 10 mg PO DAILY 05/26/20 07/08/20 History


 


Tolterodine Tartrate [Detrol LA] 4 mg PO DAILY 05/26/20 07/08/20 History


 


Aspirin 81 mg PO DAILY #30 chew 05/30/20 07/08/20 Rx


 


Furosemide [Lasix] 40 mg PO DAILY #30 tab 05/30/20 07/08/20 Rx


 


Nitroglycerin Sl Tabs [Nitrostat] 0.4 mg SUBLINGUAL Q5M PRN #25 tab 05/30/20 07/08/20 Rx


 


Prasugrel [Effient] 10 mg PO DAILY #30 tab 05/30/20 07/08/20 Rx


 


metFORMIN HCL [Glucophage] 500 mg PO TID #0 05/30/20 07/08/20 Rx


 


Metoprolol Tartrate 12.5 mg PO BID 07/08/20 07/08/20 History








                                    Allergies











Allergy/AdvReac Type Severity Reaction Status Date / Time


 


sulfamethoxazole Allergy  Unknown Verified 07/08/20 16:49





[From Bactrim]     


 


trimethoprim [From Bactrim] Allergy  Unknown Verified 07/08/20 16:49














Physical Exam


Vitals: 


                                   Vital Signs











  Temp Pulse Pulse Resp BP BP Pulse Ox


 


 07/09/20 07:00  98.3 F   81  18   126/68  96


 


 07/09/20 02:51     18   


 


 07/09/20 02:38  98.9 F      


 


 07/09/20 01:44  98.4 F   76  18   123/71  96


 


 07/08/20 23:36     16   


 


 07/08/20 21:35  99.9 F H      


 


 07/08/20 19:03  100.7 F H   85  18   119/63  96


 


 07/08/20 18:27  100.5 F H   84  18   97/59  93 L


 


 07/08/20 16:51  102.8 F H  104 H   18  152/78   99


 


 07/08/20 15:13  100.1 F H  96   18  141/69   97








                                Intake and Output











 07/08/20 07/09/20 07/09/20





 22:59 06:59 14:59


 


Intake Total 100  


 


Output Total  200 


 


Balance 100 -200 


 


Intake:   


 


  Oral 100  


 


Output:   


 


  Urine  200 


 


Other:   


 


  # Voids 1 1 


 


  Weight 136.078 kg  136.078 kg











GENERAL DESCRIPTION: Elderly female lying in bed, no distress. No tachypnea or 

accessory muscle of respiration use.


HEENT: Shows Pallor , no scleral icterus. Oral mucous membrane is dry. No 

pharyngeal erythema or thrush


NECK: Trachea central, no thyromegaly.


LUNGS: Unlabored breathing. Clear to auscultation anteriorly. No wheeze or 

crackle.


HEART: S1, S2, regular rate and rhythm. No loud murmur


ABDOMEN: Soft, no tenderness , guarding or rigidity, no organomegaly


EXTREMITIES: Left heel wound with no significant slough tissue and minimal drai

nage no foul-smelling the with significant swelling redness and warmth to the 

left leg


SKIN: No rash, no masses palpable.


NEUROLOGICAL: The patient is awake, alert, oriented x3, mood and affect normal.

















Results


CBC & Chem 7: 


                                 07/09/20 07:10





                                 07/09/20 07:10


Labs: 


                  Abnormal Lab Results - Last 24 Hours (Table)











  07/09/20 07/09/20 07/09/20 Range/Units





  07:10 07:10 07:32 


 


WBC  13.9 H    (3.8-10.6)  k/uL


 


RDW  15.7 H    (11.5-15.5)  %


 


Neutrophils #  11.9 H    (1.3-7.7)  k/uL


 


Sodium   132 L   (137-145)  mmol/L


 


BUN   22 H   (7-17)  mg/dL


 


Creatinine   1.16 H   (0.52-1.04)  mg/dL


 


Glucose   192 H   (74-99)  mg/dL


 


POC Glucose (mg/dL)    228 H  (75-99)  mg/dL


 


Calcium   8.0 L   (8.4-10.2)  mg/dL


 


Total Protein   5.7 L   (6.3-8.2)  g/dL


 


Albumin   2.8 L   (3.5-5.0)  g/dL














  07/09/20 Range/Units





  11:57 


 


WBC   (3.8-10.6)  k/uL


 


RDW   (11.5-15.5)  %


 


Neutrophils #   (1.3-7.7)  k/uL


 


Sodium   (137-145)  mmol/L


 


BUN   (7-17)  mg/dL


 


Creatinine   (0.52-1.04)  mg/dL


 


Glucose   (74-99)  mg/dL


 


POC Glucose (mg/dL)  214 H  (75-99)  mg/dL


 


Calcium   (8.4-10.2)  mg/dL


 


Total Protein   (6.3-8.2)  g/dL


 


Albumin   (3.5-5.0)  g/dL














Assessment and Plan


Assessment: 


1- patient presented to the hospital with sepsis in this patient would fever 

tachycardia and elevated white count source is a cute her left lower extremity 

cellulitis with a portal of knees likely nonhealing wound to the left heel area 

now with evidence of underlying abscess and deep infection need to be ruled out 

such as an abscess for osteomyelitis


2- gram-positive bacteremia source is likely left heel wound and lower extremity

cellulitis


3-sulfa ALLERGY





(1) Cellulitis of left lower extremity


Current Visit: Yes   Status: Acute   Code(s): L03.116 - CELLULITIS OF LEFT LOWER

LIMB   SNOMED Code(s): 646777828


   





(2) Gram-positive bacteremia


Current Visit: Yes   Status: Acute   Code(s): R78.81 - BACTEREMIA   SNOMED 

Code(s): 916843576644


   





(3) Sepsis


Current Visit: Yes   Status: Acute   Code(s): A41.9 - SEPSIS, UNSPECIFIED 

ORGANISM   SNOMED Code(s): 59615228


   


Plan: 


1- blood cultures will be repeated document clearance of bacteremia


2-check a CRP and sed rate


3-CT of the left foot to make sure no evidence of any abscess or osteomyelitis


4-Vancomycin pharmacy to dose target trough of 15 while watching his kidney 

function and Vanco trough closely


5- dry Aquacel silver dressing to the left heel wound followed by Ace wrap from 

just above the toe to below the knee


We will follow on clinical condition and cultures to further adjust medication 

if needed


Thank you for this consultation will follow this patient with you





Time with Patient: Greater than 30

## 2020-07-11 LAB
ANION GAP SERPL CALC-SCNC: 3 MMOL/L
BASOPHILS # BLD AUTO: 0 K/UL (ref 0–0.2)
BASOPHILS NFR BLD AUTO: 0 %
BUN SERPL-SCNC: 24 MG/DL (ref 7–17)
CALCIUM SPEC-MCNC: 8.6 MG/DL (ref 8.4–10.2)
CHLORIDE SERPL-SCNC: 100 MMOL/L (ref 98–107)
CO2 SERPL-SCNC: 30 MMOL/L (ref 22–30)
EOSINOPHIL # BLD AUTO: 0.1 K/UL (ref 0–0.7)
EOSINOPHIL NFR BLD AUTO: 1 %
ERYTHROCYTE [DISTWIDTH] IN BLOOD BY AUTOMATED COUNT: 4.5 M/UL (ref 3.8–5.4)
ERYTHROCYTE [DISTWIDTH] IN BLOOD: 15.8 % (ref 11.5–15.5)
GLUCOSE BLD-MCNC: 194 MG/DL (ref 75–99)
GLUCOSE BLD-MCNC: 277 MG/DL (ref 75–99)
GLUCOSE BLD-MCNC: 285 MG/DL (ref 75–99)
GLUCOSE BLD-MCNC: 294 MG/DL (ref 75–99)
GLUCOSE SERPL-MCNC: 209 MG/DL (ref 74–99)
HCT VFR BLD AUTO: 36.9 % (ref 34–46)
HGB BLD-MCNC: 11.2 GM/DL (ref 11.4–16)
LYMPHOCYTES # SPEC AUTO: 1.6 K/UL (ref 1–4.8)
LYMPHOCYTES NFR SPEC AUTO: 20 %
MCH RBC QN AUTO: 24.9 PG (ref 25–35)
MCHC RBC AUTO-ENTMCNC: 30.4 G/DL (ref 31–37)
MCV RBC AUTO: 82 FL (ref 80–100)
MONOCYTES # BLD AUTO: 0.4 K/UL (ref 0–1)
MONOCYTES NFR BLD AUTO: 5 %
NEUTROPHILS # BLD AUTO: 5.6 K/UL (ref 1.3–7.7)
NEUTROPHILS NFR BLD AUTO: 70 %
PLATELET # BLD AUTO: 295 K/UL (ref 150–450)
POTASSIUM SERPL-SCNC: 4.4 MMOL/L (ref 3.5–5.1)
SODIUM SERPL-SCNC: 133 MMOL/L (ref 137–145)
WBC # BLD AUTO: 8 K/UL (ref 3.8–10.6)

## 2020-07-11 RX ADMIN — INSULIN ASPART SCH UNIT: 100 INJECTION, SOLUTION INTRAVENOUS; SUBCUTANEOUS at 17:13

## 2020-07-11 RX ADMIN — OXYBUTYNIN CHLORIDE SCH MG: 10 TABLET, EXTENDED RELEASE ORAL at 07:30

## 2020-07-11 RX ADMIN — INSULIN ASPART SCH UNIT: 100 INJECTION, SOLUTION INTRAVENOUS; SUBCUTANEOUS at 07:26

## 2020-07-11 RX ADMIN — HEPARIN SODIUM SCH UNIT: 5000 INJECTION, SOLUTION INTRAVENOUS; SUBCUTANEOUS at 20:33

## 2020-07-11 RX ADMIN — HEPARIN SODIUM SCH UNIT: 5000 INJECTION, SOLUTION INTRAVENOUS; SUBCUTANEOUS at 07:26

## 2020-07-11 RX ADMIN — INSULIN ASPART SCH UNIT: 100 INJECTION, SOLUTION INTRAVENOUS; SUBCUTANEOUS at 12:38

## 2020-07-11 RX ADMIN — ASPIRIN 81 MG CHEWABLE TABLET SCH MG: 81 TABLET CHEWABLE at 07:29

## 2020-07-11 RX ADMIN — SODIUM CHLORIDE SCH MLS/HR: 9 INJECTION, SOLUTION INTRAVENOUS at 12:39

## 2020-07-11 RX ADMIN — PRASUGREL SCH MG: 10 TABLET, FILM COATED ORAL at 07:31

## 2020-07-11 RX ADMIN — METOPROLOL TARTRATE SCH MG: 25 TABLET, FILM COATED ORAL at 07:30

## 2020-07-11 RX ADMIN — INSULIN DETEMIR SCH UNIT: 100 INJECTION, SOLUTION SUBCUTANEOUS at 07:26

## 2020-07-11 RX ADMIN — INSULIN ASPART SCH UNIT: 100 INJECTION, SOLUTION INTRAVENOUS; SUBCUTANEOUS at 20:33

## 2020-07-11 RX ADMIN — METOPROLOL TARTRATE SCH MG: 25 TABLET, FILM COATED ORAL at 20:34

## 2020-07-11 RX ADMIN — FUROSEMIDE SCH MG: 40 TABLET ORAL at 07:30

## 2020-07-11 NOTE — P.PN
Subjective


Progress Note Date: 07/11/20





The patient was 65-year-old female with a PMH of coronary artery disease status 

post stenting, type II DM, and hypertension, who was transferred from [facility] where she had presented to the ED with a constellation of symptoms 

including lightheadedness, fever, nausea, and generalized weakness.  The patient

was noted to have sepsis with an ulcer of the left heel along with the lateral 

lower extremity erythema and warmth.  She was given Zosyn and vancomycin at 

[place] and was transferred to [facility] where she was thereby 

admitted to the medicine service with infectious disease and wound care on 

consult.  She was started on vancomycin and ceftriaxone.  CT of the left foot 

revealed no evidence of underlying abscess or osteomyelitis.  The patient's 

blood culture from [facility] were positive for gram-positive cocci.  The

patient's blood cultures from [facility] continued to be negative to 

date at 48 hours. 


The patient was seen and evaluated at the bedside on 7/11.  She reported feeling

better and denied active complaints.  Denied fever, chills, nausea, vomiting, 

chest pain, shortness of breath.





Objective





- Vital Signs


Vital signs: 


                                   Vital Signs











Temp  99.2 F   07/11/20 08:00


 


Pulse  69   07/11/20 08:00


 


Resp  17   07/11/20 08:00


 


BP  133/74   07/11/20 08:00


 


Pulse Ox  98   07/11/20 08:00








                                 Intake & Output











 07/10/20 07/11/20 07/11/20





 18:59 06:59 18:59


 


Intake Total   580


 


Output Total 1450 1350 


 


Balance -1450 -1350 580


 


Intake:   


 


  Oral   580


 


Output:   


 


  Urine 1450 1350 


 


Other:   


 


  Voiding Method   Toilet


 


  # Voids  1 2


 


  # Bowel Movements   1














- Exam





General: Non-toxic, in no acute distress, appears stated age, morbidly obese


HEENT: NC/AT, anicteric sclerae, moist conjunctiva, no lid-lag, PERRLA


Cardiovascular: S1/S2 wnl, no murmurs, rubs, or gallops


Lungs: Clear to auscultation, normal respiratory effort, no accessory muscle use




Abdominal: Soft, non-tender, non-distended, no guarding, rebound, or rigidity


Skin: Warm, dry


Extremities: Bilateral lower extremity chronic venous stasis changes with the 

left heel dressing in place with minimal surrounding erythema


Psychiatric: Alert and oriented to person, place and time, appropriate affect


Neuro: CN II-XII grossly intact, no focal neuro deficits noted, Speech intact, 

Sensation to light touch grossly intact throughout





- Labs


CBC & Chem 7: 


                                 07/11/20 05:38





                                 07/11/20 05:38


Labs: 


                  Abnormal Lab Results - Last 24 Hours (Table)











  07/10/20 07/10/20 07/11/20 Range/Units





  17:18 20:07 05:38 


 


Hgb    11.2 L  (11.4-16.0)  gm/dL


 


MCH    24.9 L  (25.0-35.0)  pg


 


MCHC    30.4 L  (31.0-37.0)  g/dL


 


RDW    15.8 H  (11.5-15.5)  %


 


ESR    83 H  (0-20)  mm/hr


 


Sodium     (137-145)  mmol/L


 


BUN     (7-17)  mg/dL


 


Glucose     (74-99)  mg/dL


 


POC Glucose (mg/dL)  260 H  250 H   (75-99)  mg/dL


 


C-Reactive Protein     (<10.0)  mg/L














  07/11/20 07/11/20 07/11/20 Range/Units





  05:38 06:58 11:20 


 


Hgb     (11.4-16.0)  gm/dL


 


MCH     (25.0-35.0)  pg


 


MCHC     (31.0-37.0)  g/dL


 


RDW     (11.5-15.5)  %


 


ESR     (0-20)  mm/hr


 


Sodium  133 L    (137-145)  mmol/L


 


BUN  24 H    (7-17)  mg/dL


 


Glucose  209 H    (74-99)  mg/dL


 


POC Glucose (mg/dL)   194 H  277 H  (75-99)  mg/dL


 


C-Reactive Protein  145.2 H    (<10.0)  mg/L








                      Microbiology - Last 24 Hours (Table)











 07/08/20 20:00 Blood Culture - Preliminary





 Blood    No Growth after 48 hours














Assessment and Plan


Plan: 





Left heel stage II ulcer with cellulitis, improved


-Infectious disease and wound care recommendations appreciated


-Continue with vancomycin and ceftriaxone


-Awaiting repeat blood cultures to be finalized





Sepsis, resolved





Gram-positive bacteremia


-Repeat blood cultures currently pending


-Infectious disease following





Acute kidney injury, resolved





Hyperkalemia, resolved





Type II DM with hyperglycemia


-Increase Levemir to 12 units


-Lispro sliding scale





Hypertension, home lisinopril being held


-Resume since RICARDA has resolved





Coronary artery disease


-Continue with home meds





DVT prophylaxis


-Heparin subq





Discussed with: Patient


Anticipated discharge date: 1-2 days


Anticipated discharge place: Home (with visiting nurse services)


A total of 30 minutes was spent on the care of this complex patient more than 

50% of the time was spent in counseling and care coordination.

## 2020-07-11 NOTE — PN
PROGRESS NOTE



DATE OF SERVICE:

07/11/2020



REASON FOR FOLLOWUP:

Right lower extremity cellulitis wound and bacteremia.



INTERVAL HISTORY:

Patient is currently afebrile.  Patient is breathing comfortably.  The patient denies

having any chest pain or shortness of breath or cough.  No abdominal pain or pain to

the left leg.



PHYSICAL EXAMINATION:

Blood pressure 146/75 with a pulse of 72, temperature 98.1.  She is 96% on room air.

General description is an elderly female up in the chair in no distress.  Respiratory

system: Unlabored breathing.  Clear to auscultation anteriorly.  Heart S1, S2.  Regular

rate and rhythm. Abdomen soft, no tenderness.  Left leg still has some swelling and

redness.  No drainage on the wound from the left foot.



LABS:

Hemoglobin 11.1, white count 8.9, BUN of 24, creatinine 0.82.  Blood culture has been

negative.



DIAGNOSTIC IMPRESSION AND PLAN:

Patient with a left heel wound with secondary cellulitis of the left leg in this

patient who did have positive blood culture at the [facility], still waiting for

the final ID of those cultures.  Continue with vancomycin.  Discharge antibiotic will

depend on the final ID of that pathogen.  Continue supportive care.





MMODL / IJN: [id number] / Job#: [id number]

## 2020-07-12 LAB
ANION GAP SERPL CALC-SCNC: 5 MMOL/L
BASOPHILS # BLD AUTO: 0 K/UL (ref 0–0.2)
BASOPHILS NFR BLD AUTO: 1 %
BUN SERPL-SCNC: 20 MG/DL (ref 7–17)
CALCIUM SPEC-MCNC: 8.5 MG/DL (ref 8.4–10.2)
CHLORIDE SERPL-SCNC: 104 MMOL/L (ref 98–107)
CO2 SERPL-SCNC: 29 MMOL/L (ref 22–30)
EOSINOPHIL # BLD AUTO: 0.2 K/UL (ref 0–0.7)
EOSINOPHIL NFR BLD AUTO: 3 %
ERYTHROCYTE [DISTWIDTH] IN BLOOD BY AUTOMATED COUNT: 4.48 M/UL (ref 3.8–5.4)
ERYTHROCYTE [DISTWIDTH] IN BLOOD: 15.3 % (ref 11.5–15.5)
GLUCOSE BLD-MCNC: 208 MG/DL (ref 75–99)
GLUCOSE BLD-MCNC: 232 MG/DL (ref 75–99)
GLUCOSE BLD-MCNC: 247 MG/DL (ref 75–99)
GLUCOSE BLD-MCNC: 248 MG/DL (ref 75–99)
GLUCOSE SERPL-MCNC: 220 MG/DL (ref 74–99)
HCT VFR BLD AUTO: 37 % (ref 34–46)
HGB BLD-MCNC: 11.9 GM/DL (ref 11.4–16)
LYMPHOCYTES # SPEC AUTO: 1.8 K/UL (ref 1–4.8)
LYMPHOCYTES NFR SPEC AUTO: 28 %
MCH RBC QN AUTO: 26.5 PG (ref 25–35)
MCHC RBC AUTO-ENTMCNC: 32 G/DL (ref 31–37)
MCV RBC AUTO: 82.7 FL (ref 80–100)
MONOCYTES # BLD AUTO: 0.4 K/UL (ref 0–1)
MONOCYTES NFR BLD AUTO: 6 %
NEUTROPHILS # BLD AUTO: 3.8 K/UL (ref 1.3–7.7)
NEUTROPHILS NFR BLD AUTO: 61 %
PLATELET # BLD AUTO: 296 K/UL (ref 150–450)
POTASSIUM SERPL-SCNC: 4.2 MMOL/L (ref 3.5–5.1)
SODIUM SERPL-SCNC: 138 MMOL/L (ref 137–145)
WBC # BLD AUTO: 6.3 K/UL (ref 3.8–10.6)

## 2020-07-12 RX ADMIN — BENZOCAINE AND MENTHOL PRN EACH: 15; 3.6 LOZENGE ORAL at 00:20

## 2020-07-12 RX ADMIN — METOPROLOL TARTRATE SCH MG: 25 TABLET, FILM COATED ORAL at 07:29

## 2020-07-12 RX ADMIN — FUROSEMIDE SCH MG: 40 TABLET ORAL at 07:29

## 2020-07-12 RX ADMIN — INSULIN ASPART SCH UNIT: 100 INJECTION, SOLUTION INTRAVENOUS; SUBCUTANEOUS at 16:50

## 2020-07-12 RX ADMIN — ASPIRIN 81 MG CHEWABLE TABLET SCH MG: 81 TABLET CHEWABLE at 07:29

## 2020-07-12 RX ADMIN — INSULIN ASPART SCH UNIT: 100 INJECTION, SOLUTION INTRAVENOUS; SUBCUTANEOUS at 07:28

## 2020-07-12 RX ADMIN — PRASUGREL SCH MG: 10 TABLET, FILM COATED ORAL at 07:29

## 2020-07-12 RX ADMIN — HEPARIN SODIUM SCH UNIT: 5000 INJECTION, SOLUTION INTRAVENOUS; SUBCUTANEOUS at 20:13

## 2020-07-12 RX ADMIN — OXYBUTYNIN CHLORIDE SCH MG: 10 TABLET, EXTENDED RELEASE ORAL at 07:29

## 2020-07-12 RX ADMIN — INSULIN ASPART SCH UNIT: 100 INJECTION, SOLUTION INTRAVENOUS; SUBCUTANEOUS at 12:27

## 2020-07-12 RX ADMIN — SODIUM CHLORIDE SCH MLS/HR: 9 INJECTION, SOLUTION INTRAVENOUS at 05:11

## 2020-07-12 RX ADMIN — METOPROLOL TARTRATE SCH MG: 25 TABLET, FILM COATED ORAL at 20:14

## 2020-07-12 RX ADMIN — BENZOCAINE AND MENTHOL PRN EACH: 15; 3.6 LOZENGE ORAL at 07:33

## 2020-07-12 RX ADMIN — HEPARIN SODIUM SCH UNIT: 5000 INJECTION, SOLUTION INTRAVENOUS; SUBCUTANEOUS at 07:28

## 2020-07-12 RX ADMIN — INSULIN ASPART SCH UNIT: 100 INJECTION, SOLUTION INTRAVENOUS; SUBCUTANEOUS at 20:13

## 2020-07-12 NOTE — P.PN
Subjective


Progress Note Date: 07/12/20





The patient was 65-year-old female with a PMH of coronary artery disease status 

post stenting, type II DM, and hypertension, who was transferred from [facility] where she had presented to the ED with a constellation of symptoms 

including lightheadedness, fever, nausea, and generalized weakness.  The patient

was noted to have sepsis with an ulcer of the left heel along with the lateral 

lower extremity erythema and warmth.  She was given Zosyn and vancomycin at 

[place] and was transferred to [facility] where she was thereby 

admitted to the medicine service with infectious disease and wound care on 

consult.  She was started on vancomycin and ceftriaxone.  CT of the left foot 

revealed no evidence of underlying abscess or osteomyelitis.  The patient's 

blood culture from [facility] were positive for gram-positive cocci.  The

patient's blood cultures from [facility] continued to be negative to 

date at 72 hours. 


The patient was seen and evaluated at the bedside on 7/12.  She denied active 

complaints including fever, chills, nausea, vomiting, chest pain, shortness of 

breath.





Objective





- Vital Signs


Vital signs: 


                                   Vital Signs











Temp  98.2 F   07/12/20 13:00


 


Pulse  68   07/12/20 13:00


 


Resp  16   07/12/20 13:00


 


BP  135/72   07/12/20 13:00


 


Pulse Ox  94 L  07/12/20 13:00








                                 Intake & Output











 07/11/20 07/12/20 07/12/20





 18:59 06:59 18:59


 


Intake Total 


 


Output Total 1200 2000 480


 


Balance -620 -1800 650


 


Weight   136.078 kg


 


Intake:   


 


  Intake, IV Titration   550





  Amount   


 


    Vancomycin 2,000 mg In   500





    Sodium Chloride 0.9% 500   





    ml 500 ml @ 167 mls/hr   





    IVPB Q16H ANNE Rx#:   





    [id number]   


 


    cefTRIAXone 1 gm In   50





    Sodium Chloride 0.9% 50   





    ml @ 100 mls/hr IVPB Q24H   





    ANNE Rx#:[id number]   


 


  Oral 580 200 580


 


Output:   


 


  Urine 1200 2000 480


 


Other:   


 


  Voiding Method Toilet  Bedside Commode





   Diaper


 


  # Voids 1  4


 


  # Bowel Movements 1  














- Exam





General: Non-toxic, in no acute distress, appears stated age, morbidly obese


HEENT: NC/AT, anicteric sclerae, moist conjunctiva, no lid-lag, PERRLA


Cardiovascular: S1/S2 wnl, no murmurs, rubs, or gallops


Lungs: Clear to auscultation, normal respiratory effort, no accessory muscle use




Abdominal: Soft, non-tender, non-distended, no guarding, rebound, or rigidity


Skin: Warm, dry


Extremities: Bilateral lower extremity chronic venous stasis changes with 

lymphedema and the left heel dressing in place with minimal surrounding erythema


Psychiatric: Alert and oriented to person, place and time, appropriate affect


Neuro: CN II-XII grossly intact, no focal neuro deficits noted, Speech intact, 

Sensation to light touch grossly intact throughout





- Labs


CBC & Chem 7: 


                                 07/12/20 07:15





                                 07/12/20 07:15


Labs: 


                  Abnormal Lab Results - Last 24 Hours (Table)











  07/11/20 07/11/20 07/12/20 Range/Units





  16:24 20:25 07:14 


 


BUN     (7-17)  mg/dL


 


Glucose     (74-99)  mg/dL


 


POC Glucose (mg/dL)  285 H  294 H  208 H  (75-99)  mg/dL














  07/12/20 07/12/20 Range/Units





  07:15 11:32 


 


BUN  20 H   (7-17)  mg/dL


 


Glucose  220 H   (74-99)  mg/dL


 


POC Glucose (mg/dL)   232 H  (75-99)  mg/dL








                      Microbiology - Last 24 Hours (Table)











 07/08/20 20:00 Blood Culture - Preliminary





 Blood    No Growth after 72 hours














Assessment and Plan


Plan: 





Left heel stage II ulcer with cellulitis, improved


-Infectious disease and wound care recommendations appreciated


-Continue with vancomycin and ceftriaxone


-Awaiting repeat blood cultures to be finalized





Sepsis, resolved





Gram-positive bacteremia


-Repeat blood cultures currently pending


-Infectious disease following





Acute kidney injury, resolved





Hyperkalemia, resolved





Type II DM with hyperglycemia


-Increase Levemir to 16 units daily


-Lispro sliding scale





Hypertension


-Continue with home med lisinopril





Coronary artery disease


-Continue with home meds





DVT prophylaxis


-Heparin subq





Discussed with: Patient


Anticipated discharge date: 1-2 days


Anticipated discharge place: Home (with visiting nurse services)


A total of 30 minutes was spent on the care of this complex patient more than 

50% of the time was spent in counseling and care coordination.

## 2020-07-12 NOTE — P.PN
Progress Note - Text


Progress Note Date: 07/12/20


REASON FOR FOLLOWUP:


Right lower extremity cellulitis wound and bacteremia.





INTERVAL HISTORY:


Patient remains to be afebrile.  Patient is breathing comfortably.  The patient 

denies


having any chest pain or shortness of breath or cough.  No abdominal pain, the 

patient


Denies pain to the left leg and overall redness has decreased





PHYSICAL EXAMINATION:


Blood pressure 140/70 with a pulse of 70, temperature 98.1.  She is 96% on room 

air.


General description is an elderly female up in the chair in no distress.  

Respiratory


system: Unlabored breathing.  Clear to auscultation anteriorly.  Heart S1, S2.  

Regular


rate and rhythm. Abdomen soft, no tenderness.  Left leg still has some swelling 

however


The redness has decreased





LABS: Blood culture done at outside facility finalized and Streptococcus


Blood culture done here has been negative








DIAGNOSTIC IMPRESSION AND PLAN:


Patient with a left heel wound with secondary cellulitis of the left leg in this


patient who did have positive blood culture at the [facility], which has 

been finalized


As Streptococcus B we'll switch her antibiotic therapy to cefazolin 2 g every 8 

hours with the plan


To finish therapy with oral Keflex continue local wound care as ordered

## 2020-07-13 VITALS — RESPIRATION RATE: 18 BRPM | DIASTOLIC BLOOD PRESSURE: 67 MMHG | SYSTOLIC BLOOD PRESSURE: 151 MMHG | HEART RATE: 67 BPM

## 2020-07-13 VITALS — TEMPERATURE: 97.8 F

## 2020-07-13 LAB
GLUCOSE BLD-MCNC: 219 MG/DL (ref 75–99)
GLUCOSE BLD-MCNC: 222 MG/DL (ref 75–99)

## 2020-07-13 RX ADMIN — METOPROLOL TARTRATE SCH MG: 25 TABLET, FILM COATED ORAL at 07:24

## 2020-07-13 RX ADMIN — ASPIRIN 81 MG CHEWABLE TABLET SCH MG: 81 TABLET CHEWABLE at 07:24

## 2020-07-13 RX ADMIN — INSULIN ASPART SCH UNIT: 100 INJECTION, SOLUTION INTRAVENOUS; SUBCUTANEOUS at 07:23

## 2020-07-13 RX ADMIN — BENZOCAINE AND MENTHOL PRN EACH: 15; 3.6 LOZENGE ORAL at 08:13

## 2020-07-13 RX ADMIN — FUROSEMIDE SCH MG: 40 TABLET ORAL at 07:24

## 2020-07-13 RX ADMIN — INSULIN ASPART SCH: 100 INJECTION, SOLUTION INTRAVENOUS; SUBCUTANEOUS at 13:08

## 2020-07-13 RX ADMIN — OXYBUTYNIN CHLORIDE SCH MG: 10 TABLET, EXTENDED RELEASE ORAL at 07:24

## 2020-07-13 RX ADMIN — HEPARIN SODIUM SCH UNIT: 5000 INJECTION, SOLUTION INTRAVENOUS; SUBCUTANEOUS at 07:24

## 2020-07-13 RX ADMIN — PRASUGREL SCH MG: 10 TABLET, FILM COATED ORAL at 07:24

## 2020-07-13 NOTE — P.DS
Providers


Date of admission: 


07/08/20 15:50





Expected date of discharge: 07/13/20


Attending physician: 


Hollei Castellano DO





Consults: 





                                        





07/08/20 18:25


Consult Physician Routine 


   Consulting Provider: Rosalba Oates


   Consult Reason/Comments: cellulitis


   Do you want consulting provider notified?: Yes











Primary care physician: 


[facility] Course: 





The patient was 65-year-old female with a PMH of coronary artery disease status 

post stenting, type II DM, and hypertension, who was transferred from [facility] where she had presented to the ED with a constellation of symptoms 

including lightheadedness, fever, nausea, and generalized weakness.  The patient

was noted to have sepsis with an ulcer of the left heel along with the lateral 

lower extremity erythema and warmth.  She was given Zosyn and vancomycin at 

[place] and was transferred to [facility] where she was thereby 

admitted to the medicine service with infectious disease and wound care on 

consult.  She was started on vancomycin and ceftriaxone.  CT of the left foot 

revealed no evidence of underlying abscess or osteomyelitis.  The patient's 

blood culture from [facility] were positive for gram-positive cocci and 

finalized as streptococci group B.  The patient's blood cultures from [facility] continued to be negative to date at 96 hours.  Infectious disease 

recommended the patient can be discharged home with oral Keflex and wound care. 

The patient was seen and evaluated at the bedside on the day of discharge.  She 

reported feeling well and has now she is back to her baseline.  She denied 

active complaints.  Denied chest pain, shortness of fever, chills, nausea, 

vomiting.





Physical Examination





General: Non-toxic, in no acute distress, appears stated age, normal weight


HEENT: NC/AT, anicteric sclerae, moist conjunctiva, no lid-lag, PERRLA


Cardiovascular: S1/S2 wnl, no murmurs, rubs, or gallops


Lungs: Clear to auscultation, normal respiratory effort, no accessory muscle use




Abdominal: Soft, non-tender, non-distended, no guarding, rebound, or rigidity


Skin: Warm, dry


Extremities: No edema or contractures


Psychiatric: Alert and oriented to person, place and time, appropriate affect


Neuro: CN II-XII grossly intact, Strength 5/5 in all 4 extremities, Speech 

intact, Sensation to light touch grossly intact throughout





Discharge diagnosis: Left heel stage II ulcer with cellulitis; Streptococcus 

bacteremia; acute kidney injury, resolved; hyperkalemia, resolved; type II DM; 

hypertension; coronary artery disease; sepsis resolved





A total of 35 minutes of time were spent preparing this complex discharge 

summary.





Patient Condition at Discharge: Stable





Plan - Discharge Summary


Discharge Rx Participant: Yes


New Discharge Prescriptions: 


New


   Cephalexin [Keflex] 500 mg PO Q6HR #40 cap





Continue


   Multivitamin [Multivitamins] 1 tab PO DAILY


   Ascorbic Acid [Vitamin C] 500 mg PO BID


   Lisinopril [Zestril] 10 mg PO DAILY


   Tolterodine Tartrate [Detrol LA] 4 mg PO DAILY


   Liraglutide [Victoza 3-Fredrick] 1.8 mg SQ DAILY


   Aspirin 81 mg PO DAILY #30 chew


   Prasugrel [Effient] 10 mg PO DAILY #30 tab


   Furosemide [Lasix] 40 mg PO DAILY #30 tab


   Nitroglycerin Sl Tabs [Nitrostat] 0.4 mg SUBLINGUAL Q5M PRN #25 tab


     PRN Reason: Chest Pain


   metFORMIN HCL [Glucophage] 500 mg PO TID #0


   Metoprolol Tartrate 12.5 mg PO BID


Discharge Medication List





Multivitamin [Multivitamins] 1 tab PO DAILY 05/01/14 [History]


Ascorbic Acid [Vitamin C] 500 mg PO BID 05/08/14 [History]


Liraglutide [Victoza 3-Fredrick] 1.8 mg SQ DAILY 05/26/20 [History]


Lisinopril [Zestril] 10 mg PO DAILY 05/26/20 [History]


Tolterodine Tartrate [Detrol LA] 4 mg PO DAILY 05/26/20 [History]


Aspirin 81 mg PO DAILY #30 chew 05/30/20 [Rx]


Furosemide [Lasix] 40 mg PO DAILY #30 tab 05/30/20 [Rx]


Nitroglycerin Sl Tabs [Nitrostat] 0.4 mg SUBLINGUAL Q5M PRN #25 tab 05/30/20 

[Rx]


Prasugrel [Effient] 10 mg PO DAILY #30 tab 05/30/20 [Rx]


metFORMIN HCL [Glucophage] 500 mg PO TID #0 05/30/20 [Rx]


Metoprolol Tartrate 12.5 mg PO BID 07/08/20 [History]


Cephalexin [Keflex] 500 mg PO Q6HR #40 cap 07/13/20 [Rx]








Follow up Appointment(s)/Referral(s): 


[facility] [NON-STAFF] - 


[facility] [Primary Care Provider] - 07/15/20 10:40 am


[facility] [NON-STAFF] - As Needed


Patient Instructions/Handouts:  Cellulitis (DC), Diabetic Foot Ulcers (DC)


Activity/Diet/Wound Care/Special Instructions: 


Keep leg dressings dry - non-weight bearing on left heel. 


Use walker as instructed





Follow a Consistent Carbohydrate diet





Dressings to be changed Tuesday, Thursday and Saturday with Home Care Nurses


(Honey sheet, Wet-to-dry gauze, kerlex bandage, ACE wrap from toes to knee with 

tape to secure.)





Ace wrap can be applied to right leg as tolerated


Discharge Disposition: HOME WITH HOME HEALTH SERVICES

## 2020-07-13 NOTE — PN
PROGRESS NOTE



DATE OF SERVICE:

07/13/2020



REASON FOR FOLLOWUP:

Left heel wound with secondary cellulitis of the left leg and Staphylococcus

bacteremia.



INTERVAL HISTORY:

Patient is currently afebrile.  The patient is breathing comfortably. The patient

denies having any chest pain.  No shortness of breath or cough.  No nausea.  No

abdominal pain.  Pain to the left leg.



PHYSICAL EXAMINATION:

Blood pressure 150/67 with a pulse of 67, temperature 97.9.  She is 91% on room air.

General description is an elderly female, up in the chair in no distress.

RESPIRATORY SYSTEM: Unlabored breathing, clear to auscultation anteriorly.

HEART S1, S2.  Regular rate and rhythm.

ABDOMEN:  Soft, no tenderness.

Left leg swelling improved as well as redness.  No drainage on the dressing.



LABS:

Blood culture has been negative.  Blood culture with the outside facility was

Streptococcus.



DIAGNOSTIC IMPRESSION AND PLAN:

Patient with left lower extremity wound with secondary cellulitis of the left leg and

streptococcal bacteremia.  Blood culture repeat has been negative.  Plan on Keflex 500

mg p.o. q.6 hours for another 10 days.  Prescription has been sent to the pharmacy.

Questions and concerns were answered.





MMODL / IJN: [id number] / Job#: [id number]